# Patient Record
Sex: FEMALE | Race: WHITE | NOT HISPANIC OR LATINO | Employment: UNEMPLOYED | URBAN - METROPOLITAN AREA
[De-identification: names, ages, dates, MRNs, and addresses within clinical notes are randomized per-mention and may not be internally consistent; named-entity substitution may affect disease eponyms.]

---

## 2019-06-26 ENCOUNTER — APPOINTMENT (OUTPATIENT)
Dept: GENERAL RADIOLOGY | Facility: HOSPITAL | Age: 54
End: 2019-06-26

## 2019-06-26 ENCOUNTER — HOSPITAL ENCOUNTER (EMERGENCY)
Facility: HOSPITAL | Age: 54
Discharge: HOME | End: 2019-06-26
Attending: EMERGENCY MEDICINE | Admitting: EMERGENCY MEDICINE

## 2019-06-26 VITALS
SYSTOLIC BLOOD PRESSURE: 119 MMHG | HEART RATE: 60 BPM | OXYGEN SATURATION: 98 % | DIASTOLIC BLOOD PRESSURE: 76 MMHG | BODY MASS INDEX: 22.44 KG/M2 | WEIGHT: 134.7 LBS | RESPIRATION RATE: 15 BRPM | TEMPERATURE: 97.9 F | HEIGHT: 65 IN

## 2019-06-26 DIAGNOSIS — R07.89 CHEST WALL PAIN: ICD-10-CM

## 2019-06-26 DIAGNOSIS — R10.11 RIGHT UPPER QUADRANT ABDOMINAL PAIN: Primary | ICD-10-CM

## 2019-06-26 LAB
ALBUMIN SERPL-MCNC: 3.7 G/DL (ref 3.5–4.8)
ALBUMIN/GLOB SERPL: 1.4 G/DL (ref 1–1.7)
ALP SERPL-CCNC: 50 U/L (ref 32–91)
ALT SERPL W P-5'-P-CCNC: 12 U/L (ref 14–54)
ANION GAP SERPL CALCULATED.3IONS-SCNC: 11 MMOL/L (ref 10–20)
AST SERPL-CCNC: 22 U/L (ref 15–41)
BASOPHILS # BLD AUTO: 0.1 10*3/MM3 (ref 0–0.2)
BASOPHILS NFR BLD AUTO: 1.4 % (ref 0–1.5)
BILIRUB SERPL-MCNC: 0.6 MG/DL (ref 0.3–1.2)
BILIRUB UR QL STRIP: NEGATIVE
BUN BLD-MCNC: 11 MG/DL (ref 8–20)
BUN/CREAT SERPL: 15.7 (ref 5.4–26.2)
CALCIUM SPEC-SCNC: 9 MG/DL (ref 8.9–10.3)
CHLORIDE SERPL-SCNC: 106 MMOL/L (ref 101–111)
CLARITY UR: CLEAR
CO2 SERPL-SCNC: 23 MMOL/L (ref 22–32)
COLOR UR: YELLOW
CREAT BLD-MCNC: 0.7 MG/DL (ref 0.4–1)
D DIMER PPP FEU-MCNC: <0.17 MCGFEU/ML (ref 0.17–0.59)
DEPRECATED RDW RBC AUTO: 43.3 FL (ref 37–54)
EOSINOPHIL # BLD AUTO: 0.2 10*3/MM3 (ref 0–0.4)
EOSINOPHIL NFR BLD AUTO: 4.4 % (ref 0.3–6.2)
ERYTHROCYTE [DISTWIDTH] IN BLOOD BY AUTOMATED COUNT: 13.5 % (ref 12.3–15.4)
GFR SERPL CREATININE-BSD FRML MDRD: 88 ML/MIN/1.73
GLOBULIN UR ELPH-MCNC: 2.7 GM/DL (ref 2.5–3.8)
GLUCOSE BLD-MCNC: 108 MG/DL (ref 65–99)
GLUCOSE UR STRIP-MCNC: NEGATIVE MG/DL
HCT VFR BLD AUTO: 41 % (ref 34–46.6)
HGB BLD-MCNC: 13.9 G/DL (ref 12–15.9)
HGB UR QL STRIP.AUTO: NEGATIVE
KETONES UR QL STRIP: NEGATIVE
LEUKOCYTE ESTERASE UR QL STRIP.AUTO: NEGATIVE
LYMPHOCYTES # BLD AUTO: 1.9 10*3/MM3 (ref 0.7–3.1)
LYMPHOCYTES NFR BLD AUTO: 37 % (ref 19.6–45.3)
MCH RBC QN AUTO: 31 PG (ref 26.6–33)
MCHC RBC AUTO-ENTMCNC: 34 G/DL (ref 31.5–35.7)
MCV RBC AUTO: 91.1 FL (ref 79–97)
MONOCYTES # BLD AUTO: 0.4 10*3/MM3 (ref 0.1–0.9)
MONOCYTES NFR BLD AUTO: 7.5 % (ref 5–12)
NEUTROPHILS # BLD AUTO: 2.5 10*3/MM3 (ref 1.7–7)
NEUTROPHILS NFR BLD AUTO: 49.7 % (ref 42.7–76)
NITRITE UR QL STRIP: NEGATIVE
NRBC BLD AUTO-RTO: 0.2 /100 WBC (ref 0–0.2)
PH UR STRIP.AUTO: 6 [PH] (ref 5–8)
PLATELET # BLD AUTO: 225 10*3/MM3 (ref 140–450)
PMV BLD AUTO: 8.6 FL (ref 6–12)
POTASSIUM BLD-SCNC: 3.8 MMOL/L (ref 3.6–5.1)
PROT SERPL-MCNC: 6.4 G/DL (ref 6.1–7.9)
PROT UR QL STRIP: NEGATIVE
RBC # BLD AUTO: 4.5 10*6/MM3 (ref 3.77–5.28)
SODIUM BLD-SCNC: 140 MMOL/L (ref 136–144)
SP GR UR STRIP: 1.02 (ref 1–1.03)
UROBILINOGEN UR QL STRIP: NORMAL
WBC NRBC COR # BLD: 5.1 10*3/MM3 (ref 3.4–10.8)

## 2019-06-26 PROCEDURE — 25010000002 KETOROLAC TROMETHAMINE PER 15 MG: Performed by: EMERGENCY MEDICINE

## 2019-06-26 PROCEDURE — 99284 EMERGENCY DEPT VISIT MOD MDM: CPT

## 2019-06-26 PROCEDURE — 96375 TX/PRO/DX INJ NEW DRUG ADDON: CPT

## 2019-06-26 PROCEDURE — 93005 ELECTROCARDIOGRAM TRACING: CPT | Performed by: EMERGENCY MEDICINE

## 2019-06-26 PROCEDURE — 25010000002 MORPHINE PER 10 MG: Performed by: EMERGENCY MEDICINE

## 2019-06-26 PROCEDURE — 85379 FIBRIN DEGRADATION QUANT: CPT | Performed by: EMERGENCY MEDICINE

## 2019-06-26 PROCEDURE — 25010000002 ONDANSETRON PER 1 MG: Performed by: EMERGENCY MEDICINE

## 2019-06-26 PROCEDURE — 80053 COMPREHEN METABOLIC PANEL: CPT | Performed by: EMERGENCY MEDICINE

## 2019-06-26 PROCEDURE — 96374 THER/PROPH/DIAG INJ IV PUSH: CPT

## 2019-06-26 PROCEDURE — 81003 URINALYSIS AUTO W/O SCOPE: CPT | Performed by: EMERGENCY MEDICINE

## 2019-06-26 PROCEDURE — 85025 COMPLETE CBC W/AUTO DIFF WBC: CPT | Performed by: EMERGENCY MEDICINE

## 2019-06-26 PROCEDURE — 71046 X-RAY EXAM CHEST 2 VIEWS: CPT

## 2019-06-26 RX ORDER — ONDANSETRON 2 MG/ML
4 INJECTION INTRAMUSCULAR; INTRAVENOUS ONCE
Status: COMPLETED | OUTPATIENT
Start: 2019-06-26 | End: 2019-06-26

## 2019-06-26 RX ORDER — SODIUM CHLORIDE 0.9 % (FLUSH) 0.9 %
10 SYRINGE (ML) INJECTION AS NEEDED
Status: DISCONTINUED | OUTPATIENT
Start: 2019-06-26 | End: 2019-06-26 | Stop reason: HOSPADM

## 2019-06-26 RX ORDER — KETOROLAC TROMETHAMINE 15 MG/ML
15 INJECTION, SOLUTION INTRAMUSCULAR; INTRAVENOUS ONCE
Status: COMPLETED | OUTPATIENT
Start: 2019-06-26 | End: 2019-06-26

## 2019-06-26 RX ORDER — ONDANSETRON 4 MG/1
4 TABLET, FILM COATED ORAL EVERY 6 HOURS PRN
Qty: 12 TABLET | Refills: 0 | Status: SHIPPED | OUTPATIENT
Start: 2019-06-26 | End: 2022-05-13

## 2019-06-26 RX ORDER — MORPHINE SULFATE 4 MG/ML
4 INJECTION, SOLUTION INTRAMUSCULAR; INTRAVENOUS ONCE
Status: COMPLETED | OUTPATIENT
Start: 2019-06-26 | End: 2019-06-26

## 2019-06-26 RX ORDER — MELOXICAM 15 MG/1
15 TABLET ORAL DAILY PRN
Qty: 10 TABLET | Refills: 0 | Status: SHIPPED | OUTPATIENT
Start: 2019-06-26 | End: 2022-10-17

## 2019-06-26 RX ORDER — HYDROCODONE BITARTRATE AND ACETAMINOPHEN 5; 325 MG/1; MG/1
1 TABLET ORAL EVERY 6 HOURS PRN
Qty: 12 TABLET | Refills: 0 | Status: SHIPPED | OUTPATIENT
Start: 2019-06-26 | End: 2022-05-13

## 2019-06-26 RX ADMIN — MORPHINE SULFATE 4 MG: 4 INJECTION INTRAVENOUS at 06:46

## 2019-06-26 RX ADMIN — KETOROLAC TROMETHAMINE 15 MG: 15 INJECTION, SOLUTION INTRAMUSCULAR; INTRAVENOUS at 05:54

## 2019-06-26 RX ADMIN — ONDANSETRON 4 MG: 2 INJECTION INTRAMUSCULAR; INTRAVENOUS at 06:46

## 2019-08-05 ENCOUNTER — TRANSCRIBE ORDERS (OUTPATIENT)
Dept: ADMINISTRATIVE | Facility: HOSPITAL | Age: 54
End: 2019-08-05

## 2019-08-05 DIAGNOSIS — R10.11 ABDOMINAL PAIN, RIGHT UPPER QUADRANT: Primary | ICD-10-CM

## 2019-08-09 ENCOUNTER — HOSPITAL ENCOUNTER (OUTPATIENT)
Dept: NUCLEAR MEDICINE | Facility: HOSPITAL | Age: 54
Discharge: HOME | End: 2019-08-09

## 2019-08-09 DIAGNOSIS — R10.11 ABDOMINAL PAIN, RIGHT UPPER QUADRANT: ICD-10-CM

## 2019-08-09 PROCEDURE — 0 TECHNETIUM TC 99M MEBROFENIN KIT: Performed by: FAMILY MEDICINE

## 2019-08-09 PROCEDURE — 78227 HEPATOBIL SYST IMAGE W/DRUG: CPT

## 2019-08-09 PROCEDURE — A9537 TC99M MEBROFENIN: HCPCS | Performed by: FAMILY MEDICINE

## 2019-08-09 RX ORDER — KIT FOR THE PREPARATION OF TECHNETIUM TC 99M MEBROFENIN 45 MG/10ML
1 INJECTION, POWDER, LYOPHILIZED, FOR SOLUTION INTRAVENOUS
Status: COMPLETED | OUTPATIENT
Start: 2019-08-09 | End: 2019-08-09

## 2019-08-09 RX ADMIN — MEBROFENIN 1 DOSE: 45 INJECTION, POWDER, LYOPHILIZED, FOR SOLUTION INTRAVENOUS at 10:10

## 2020-06-09 ENCOUNTER — TREATMENT (OUTPATIENT)
Dept: PHYSICAL THERAPY | Facility: CLINIC | Age: 55
End: 2020-06-09

## 2020-06-09 ENCOUNTER — TRANSCRIBE ORDERS (OUTPATIENT)
Dept: PHYSICAL THERAPY | Facility: CLINIC | Age: 55
End: 2020-06-09

## 2020-06-09 DIAGNOSIS — S46.001A ROTATOR CUFF INJURY, RIGHT, INITIAL ENCOUNTER: ICD-10-CM

## 2020-06-09 DIAGNOSIS — S46.001A INJURY OF RIGHT ROTATOR CUFF, INITIAL ENCOUNTER: Primary | ICD-10-CM

## 2020-06-09 DIAGNOSIS — S46.001A ROTATOR CUFF INJURY, RIGHT, INITIAL ENCOUNTER: Primary | ICD-10-CM

## 2020-06-09 PROCEDURE — 97110 THERAPEUTIC EXERCISES: CPT | Performed by: PHYSICAL THERAPIST

## 2020-06-09 PROCEDURE — 97161 PT EVAL LOW COMPLEX 20 MIN: CPT | Performed by: PHYSICAL THERAPIST

## 2020-06-09 NOTE — PROGRESS NOTES
"Physical Therapy Initial Evaluation and Plan of Care    Patient: Tonja Ledbetter   : 1965  Diagnosis/ICD-10 Code:  Injury of right rotator cuff, initial encounter [S46.001A]  Referring practitioner: Víctor Hull MD    Subjective Evaluation    History of Present Illness  Mechanism of injury: Patient reports she is coming to therapy due to R shoulder pain.  She fell on  catching herself with her arm behind her on her hand.  She felt her shoulder be pushed upward.  Ever since she is not able to raise her arm all the way up without help from her L arm.  She tre N/T or any neck pain.  Only is having pain when raising her arm, and sometimes she is noticing some discomfort at rest if she reaches the wrong way.  She was diagnosed with breast CA in September last year, but now is \"cured\" per pt report.  She is still working on getting her energy and strength back, and she does walk daily.        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  Quality of life: good    Pain  Current pain ratin  At best pain ratin  At worst pain rating: 10  Location: R shoulder into biceps region   Quality: discomfort, throbbing and dull ache  Relieving factors: relaxation, rest, support and change in position  Aggravating factors: lifting, movement, overhead activity, prolonged positioning, outstretched reach and repetitive movement  Progression: no change    Social Support  Lives in: multiple-level home  Lives with: spouse    Hand dominance: right    Diagnostic Tests  No diagnostic tests performed    Treatments  No previous or current treatments  Patient Goals  Patient goals for therapy: decreased pain, increased motion, increased strength and independence with ADLs/IADLs        Objective          Postural Observations  Seated posture: fair  Standing posture: good  Correction of posture: has no consistent effect    Additional Postural Observation Details  Slight forward head posture with rounded shoulders " slightly.     Palpation     Right Tenderness of the anterior deltoid, biceps and supraspinatus.     Tenderness     Right Shoulder  Tenderness in the biceps tendon (proximal) and supraspinatus tendon.     Cervical/Thoracic Screen   Cervical range of motion within normal limits    Active Range of Motion   Left Shoulder   Normal active range of motion    Right Shoulder   Flexion: 68 degrees with pain  Extension: 56 degrees   Abduction: 60 degrees with pain  External rotation 0°: 52 degrees   Internal rotation BTB: T10 with pain    Passive Range of Motion     Additional Passive Range of Motion Details  R shoulder PROM grossly WFL; however, pain at ~160 degrees flexion and abduction. ER pain at ~65 degrees. IR no pain passively.     Joint Play     Right Shoulder  Joints within functional limits are the posterior capsule, inferior capsule and long axis distraction.     Strength/Myotome Testing     Left Shoulder     Planes of Motion   Flexion: 4+   Abduction: 4+   External rotation at 0°: 4+   Internal rotation at 0°: 4+     Isolated Muscles   Biceps: 4+   Supraspinatus: 4+   Triceps: 4+     Right Shoulder     Planes of Motion   Flexion: 2-   Abduction: 2-   External rotation at 0°: 3-   Internal rotation at 0°: 3-     Isolated Muscles   Biceps: 4   Supraspinatus: 3-   Triceps: 4     Tests     Right Shoulder   Positive empty can, full can and lift-off.        Assessment & Plan     Assessment  Impairments: abnormal muscle firing, abnormal or restricted ROM, activity intolerance, impaired physical strength, lacks appropriate home exercise program and pain with function  Prognosis: good  Functional Limitations: carrying objects, lifting, pulling, pushing, uncomfortable because of pain, reaching behind back, reaching overhead and unable to perform repetitive tasks  Goals  Plan Goals: Pt would benefit from skilled care for the listed deficits of the R shoulder.    SHORT TERM GOALS: Time for Goal Achievement: 4 weeks  1.   Patient to be compliant with and independent with progression of HEP.   2. Patient to improve her R shoulder elevation to 110 degrees or greater with min pain to improve her ability to perform above shoulder level activities.   3. Pt to improve her pain at worst to a 6/10 to improve her ability to perform daily functional tasks.     LONG TERM GOALS: Time for Goal Achievement: 2 months  1.  Pt score < 30% perceived disability on Quick DASH.  2.  Pt. to exhibit (R) shoulder AROM to WFL to allow for reaching overhead and out (ABD) without pain limiting function.  3.  Pt to exhibit 4+/5 UE strength, or greater, to allow for pushing /pulling, reaching, and lifting to occur with pain < 3/10.   4.  Pt able to reach overhead and lift 10# to allow for return to doing home/yard/recreational activities with min to no pain.      Plan  Therapy options: will be seen for skilled physical therapy services  Planned modality interventions: cryotherapy and thermotherapy (hydrocollator packs)  Planned therapy interventions: functional ROM exercises, flexibility, home exercise program, joint mobilization, manual therapy, soft tissue mobilization, spinal/joint mobilization, strengthening, stretching and therapeutic activities  Frequency: 2x week  Duration in visits: 16  Treatment plan discussed with: patient  Plan details: Progress ROM/strengthening/stabilization/functional activity as tolerated.             History # of Personal Factors and/or Comorbidities: MODERATE (1-2)  Examination of Body System(s): # of elements: LOW (1-2)  Clinical Presentation: STABLE   Clinical Decision Making: LOW     Timed:         Manual Therapy:         mins  12309;     Therapeutic Exercise:    20     mins  22396;     Neuromuscular [place]:        mins  13333;    Therapeutic Activity:          mins  36964;     Gait Training:           mins  97142;     Ultrasound:          mins  94711;    Ionto                                   mins   30333  Self Care                             mins   29946        Un-Timed:  Electrical Stimulation:         mins  84846 ( );  Dry Needling          mins self-pay  Traction          mins 37156  Low Eval    40      Mins  93813  Mod Eval          Mins  66867  High Eval                            Mins  41898  Re-Eval                               mins  97938        Timed Treatment:  20    mins   Total Treatment:     60   mins  PT SIGNATURE: Katlin Turner, PT, DPT    DATE TREATMENT INITIATED: 6/9/2020    Initial Certification  Certification Period: 9/7/2020  I certify that the therapy services are furnished while this patient is under my care.  The services outlined above are required by this patient, and will be reviewed every 90 days.     PHYSICIAN:       DATE:     Please sign and return via fax to [phone].. Thank you, [facility].

## 2020-06-11 ENCOUNTER — TREATMENT (OUTPATIENT)
Dept: PHYSICAL THERAPY | Facility: CLINIC | Age: 55
End: 2020-06-11

## 2020-06-11 DIAGNOSIS — S46.001A INJURY OF RIGHT ROTATOR CUFF, INITIAL ENCOUNTER: Primary | ICD-10-CM

## 2020-06-11 DIAGNOSIS — S46.001A ROTATOR CUFF INJURY, RIGHT, INITIAL ENCOUNTER: ICD-10-CM

## 2020-06-11 PROCEDURE — 97140 MANUAL THERAPY 1/> REGIONS: CPT | Performed by: PHYSICAL THERAPIST

## 2020-06-11 PROCEDURE — 97110 THERAPEUTIC EXERCISES: CPT | Performed by: PHYSICAL THERAPIST

## 2020-06-11 NOTE — PROGRESS NOTES
Physical Therapy Daily Progress Note    VISIT#: 2/16 in POC.     Subjective   Tonja Ledbetter reports she is doing ok, but has been sore since beginning her HEP.  She notices some soreness along her incision on her R breast and some discomfort into her axillary region.      Pain Rating (0/10): 5    Objective     See Exercise, Manual, and Modality Logs for complete treatment.     Patient Education: Instructed pt to continue with current HEP.  Discussed shoulder pulleys also.       Assessment/Plan   Cues via verbal and tactile required to help pt reduce UT over activation and encourage good GH joint motion.  Slight soreness end of treatment.     Plan  Progress strengthening /stabilization /functional activity.             Timed:         Manual Therapy:    15     mins  47504;     Therapeutic Exercise:    30     mins  55693;     Neuromuscular [place]:        mins  16180;    Therapeutic Activity:          mins  52680;     Gait Training:           mins  94929;     Ultrasound:          mins  98902;    Ionto                                   mins   29895  Self Care                            mins   22493    Un-Timed:  Electrical Stimulation:         mins  58439 ( );  Dry Needling          mins self-pay  Traction          mins 17540  Low Eval          Mins  64770  Mod Eval          Mins  40373  High Eval                            Mins  37684  Re-Eval                               mins  23734    Timed Treatment:   45   mins   Total Treatment:     45   mins    Katlin Turner, PT, DPT  Physical Therapist   Home

## 2020-06-18 ENCOUNTER — OFFICE (OUTPATIENT)
Dept: URBAN - METROPOLITAN AREA CLINIC 64 | Facility: CLINIC | Age: 55
End: 2020-06-18
Payer: COMMERCIAL

## 2020-06-18 VITALS
DIASTOLIC BLOOD PRESSURE: 75 MMHG | SYSTOLIC BLOOD PRESSURE: 119 MMHG | HEIGHT: 65 IN | WEIGHT: 127 LBS | HEART RATE: 79 BPM

## 2020-06-18 DIAGNOSIS — R10.31 RIGHT LOWER QUADRANT PAIN: ICD-10-CM

## 2020-06-18 DIAGNOSIS — Z12.11 ENCOUNTER FOR SCREENING FOR MALIGNANT NEOPLASM OF COLON: ICD-10-CM

## 2020-06-18 DIAGNOSIS — R10.11 RIGHT UPPER QUADRANT PAIN: ICD-10-CM

## 2020-06-18 PROCEDURE — 99203 OFFICE O/P NEW LOW 30 MIN: CPT | Performed by: INTERNAL MEDICINE

## 2020-06-18 RX ORDER — DICYCLOMINE HYDROCHLORIDE 20 MG/1
20 TABLET ORAL
Qty: 30 | Refills: 11 | Status: ACTIVE
Start: 2020-06-18

## 2020-07-01 RX ORDER — LORAZEPAM 2 MG/1
1 TABLET ORAL NIGHTLY
COMMUNITY
End: 2022-05-13

## 2020-07-01 RX ORDER — IBUPROFEN 200 MG
200 TABLET ORAL EVERY 6 HOURS PRN
COMMUNITY

## 2020-07-11 ENCOUNTER — LAB (OUTPATIENT)
Dept: LAB | Facility: HOSPITAL | Age: 55
End: 2020-07-11

## 2020-07-11 PROCEDURE — C9803 HOPD COVID-19 SPEC COLLECT: HCPCS

## 2020-07-11 PROCEDURE — U0002 COVID-19 LAB TEST NON-CDC: HCPCS

## 2020-07-11 PROCEDURE — U0004 COV-19 TEST NON-CDC HGH THRU: HCPCS

## 2020-07-13 ENCOUNTER — ANESTHESIA EVENT (OUTPATIENT)
Dept: GASTROENTEROLOGY | Facility: HOSPITAL | Age: 55
End: 2020-07-13

## 2020-07-13 LAB
REF LAB TEST METHOD: NORMAL
SARS-COV-2 RNA RESP QL NAA+PROBE: NOT DETECTED

## 2020-07-14 ENCOUNTER — HOSPITAL ENCOUNTER (OUTPATIENT)
Facility: HOSPITAL | Age: 55
Setting detail: HOSPITAL OUTPATIENT SURGERY
Discharge: HOME | End: 2020-07-14
Attending: INTERNAL MEDICINE | Admitting: INTERNAL MEDICINE

## 2020-07-14 ENCOUNTER — ON CAMPUS - OUTPATIENT (OUTPATIENT)
Dept: URBAN - METROPOLITAN AREA HOSPITAL 85 | Facility: HOSPITAL | Age: 55
End: 2020-07-14
Payer: COMMERCIAL

## 2020-07-14 ENCOUNTER — ANESTHESIA (OUTPATIENT)
Dept: GASTROENTEROLOGY | Facility: HOSPITAL | Age: 55
End: 2020-07-14

## 2020-07-14 VITALS
WEIGHT: 126.98 LBS | HEIGHT: 65 IN | TEMPERATURE: 98.4 F | RESPIRATION RATE: 14 BRPM | BODY MASS INDEX: 21.16 KG/M2 | HEART RATE: 77 BPM | SYSTOLIC BLOOD PRESSURE: 137 MMHG | DIASTOLIC BLOOD PRESSURE: 76 MMHG | OXYGEN SATURATION: 98 %

## 2020-07-14 DIAGNOSIS — R10.11 RIGHT UPPER QUADRANT PAIN: ICD-10-CM

## 2020-07-14 DIAGNOSIS — K25.9 GASTRIC ULCER, UNSPECIFIED AS ACUTE OR CHRONIC, WITHOUT HEMO: ICD-10-CM

## 2020-07-14 DIAGNOSIS — Z12.11 ENCOUNTER FOR SCREENING FOR MALIGNANT NEOPLASM OF COLON: ICD-10-CM

## 2020-07-14 PROBLEM — C50.911 BREAST CANCER, RIGHT BREAST (HCC): Status: ACTIVE | Noted: 2020-02-07

## 2020-07-14 PROBLEM — C50.411 MALIGNANT NEOPLASM OF UPPER-OUTER QUADRANT OF RIGHT BREAST IN FEMALE, ESTROGEN RECEPTOR NEGATIVE: Status: ACTIVE | Noted: 2019-09-25

## 2020-07-14 PROBLEM — Z09 CHEMOTHERAPY FOLLOW-UP EXAMINATION: Status: ACTIVE | Noted: 2019-10-18

## 2020-07-14 PROBLEM — Z17.1 MALIGNANT NEOPLASM OF UPPER-OUTER QUADRANT OF RIGHT BREAST IN FEMALE, ESTROGEN RECEPTOR NEGATIVE (HCC): Status: ACTIVE | Noted: 2019-09-25

## 2020-07-14 PROCEDURE — 25010000002 ONDANSETRON PER 1 MG

## 2020-07-14 PROCEDURE — 25010000002 PROPOFOL 10 MG/ML EMULSION: Performed by: STUDENT IN AN ORGANIZED HEALTH CARE EDUCATION/TRAINING PROGRAM

## 2020-07-14 PROCEDURE — 43235 EGD DIAGNOSTIC BRUSH WASH: CPT | Mod: 59 | Performed by: INTERNAL MEDICINE

## 2020-07-14 PROCEDURE — 45378 DIAGNOSTIC COLONOSCOPY: CPT | Mod: 33 | Performed by: INTERNAL MEDICINE

## 2020-07-14 RX ORDER — ONDANSETRON 2 MG/ML
4 INJECTION INTRAMUSCULAR; INTRAVENOUS ONCE
Status: COMPLETED | OUTPATIENT
Start: 2020-07-14 | End: 2020-07-14

## 2020-07-14 RX ORDER — ESCITALOPRAM OXALATE 10 MG/1
10 TABLET ORAL DAILY
COMMUNITY

## 2020-07-14 RX ORDER — MAGNESIUM CARB/ALUMINUM HYDROX 105-160MG
296 TABLET,CHEWABLE ORAL ONCE
Status: DISCONTINUED | OUTPATIENT
Start: 2020-07-14 | End: 2020-07-14 | Stop reason: HOSPADM

## 2020-07-14 RX ORDER — SODIUM CHLORIDE 0.9 % (FLUSH) 0.9 %
10 SYRINGE (ML) INJECTION AS NEEDED
Status: DISCONTINUED | OUTPATIENT
Start: 2020-07-14 | End: 2020-07-14 | Stop reason: HOSPADM

## 2020-07-14 RX ORDER — LIDOCAINE HYDROCHLORIDE 20 MG/ML
INJECTION, SOLUTION INFILTRATION; PERINEURAL AS NEEDED
Status: DISCONTINUED | OUTPATIENT
Start: 2020-07-14 | End: 2020-07-14 | Stop reason: SURG

## 2020-07-14 RX ORDER — ONDANSETRON 2 MG/ML
INJECTION INTRAMUSCULAR; INTRAVENOUS
Status: COMPLETED
Start: 2020-07-14 | End: 2020-07-14

## 2020-07-14 RX ORDER — GLYCOPYRROLATE 0.2 MG/ML
INJECTION INTRAMUSCULAR; INTRAVENOUS AS NEEDED
Status: DISCONTINUED | OUTPATIENT
Start: 2020-07-14 | End: 2020-07-14 | Stop reason: SURG

## 2020-07-14 RX ORDER — ONDANSETRON 2 MG/ML
4 INJECTION INTRAMUSCULAR; INTRAVENOUS ONCE AS NEEDED
Status: DISCONTINUED | OUTPATIENT
Start: 2020-07-14 | End: 2020-07-14 | Stop reason: HOSPADM

## 2020-07-14 RX ORDER — SODIUM CHLORIDE 9 MG/ML
9 INJECTION, SOLUTION INTRAVENOUS CONTINUOUS PRN
Status: DISCONTINUED | OUTPATIENT
Start: 2020-07-14 | End: 2020-07-14 | Stop reason: HOSPADM

## 2020-07-14 RX ORDER — PROPOFOL 10 MG/ML
VIAL (ML) INTRAVENOUS AS NEEDED
Status: DISCONTINUED | OUTPATIENT
Start: 2020-07-14 | End: 2020-07-14 | Stop reason: SURG

## 2020-07-14 RX ORDER — SODIUM CHLORIDE 0.9 % (FLUSH) 0.9 %
3 SYRINGE (ML) INJECTION EVERY 12 HOURS SCHEDULED
Status: DISCONTINUED | OUTPATIENT
Start: 2020-07-14 | End: 2020-07-14 | Stop reason: HOSPADM

## 2020-07-14 RX ORDER — SODIUM CHLORIDE 0.9 % (FLUSH) 0.9 %
10 SYRINGE (ML) INJECTION EVERY 12 HOURS SCHEDULED
Status: DISCONTINUED | OUTPATIENT
Start: 2020-07-14 | End: 2020-07-14 | Stop reason: HOSPADM

## 2020-07-14 RX ADMIN — PROPOFOL 600 MG: 10 INJECTION, EMULSION INTRAVENOUS at 08:10

## 2020-07-14 RX ADMIN — ONDANSETRON 4 MG: 2 INJECTION INTRAMUSCULAR; INTRAVENOUS at 07:41

## 2020-07-14 RX ADMIN — LIDOCAINE HYDROCHLORIDE 100 MG: 20 INJECTION, SOLUTION INFILTRATION; PERINEURAL at 08:08

## 2020-07-14 RX ADMIN — SODIUM CHLORIDE 9 ML/HR: 900 INJECTION, SOLUTION INTRAVENOUS at 07:27

## 2020-07-14 RX ADMIN — GLYCOPYRROLATE 0.2 MCG: 0.2 INJECTION, SOLUTION INTRAMUSCULAR; INTRAVENOUS at 08:08

## 2020-07-14 NOTE — OP NOTE
COLONOSCOPY, ESOPHAGOGASTRODUODENOSCOPY Procedure Report    Patient Name:  Tonja Ledbetter  YOB: 1965    Date of Surgery:  7/14/2020     Pre-Op Diagnosis:  Screening for malignant neoplasm of colon [Z12.11]  Chronic RLQ pain [R10.31, G89.29]  RUQ pain [R10.11]       Post-Op Diagnosis Codes:     * Screening for malignant neoplasm of colon [Z12.11]     * Chronic RLQ pain [R10.31, G89.29]     * RUQ pain [R10.11]      Procedure/CPT® Codes:        Staff:  Surgeon(s):  Cong Alvares MD         Anesthesia: Monitored Anesthesia Care    Implants:    Nothing was implanted during the procedure    Specimen:        See Below    Complications:  None    Description of Procedure:  Informed consent was obtained for the procedure, including sedation.  Risks of perforation, hemorrhage, adverse drug reaction and aspiration were discussed.  The patient was brought into the endoscopy suite. Continuous cardiopulmonary monitoring was performed. The patient was placed in the left lateral decubitus position.  The bite block was inserted into the patient's mouth. After adequate sedation was attained, the Olympus gastroscope was inserted into the patient's mouth and advanced to the second portion of the duodenum without difficulty.  Circumferential examination was performed. A retroflex exam was performed in the patient's stomach.  On completion of the exam, the bowel was decompressed, the scope was removed from the patient, the patient tolerated the procedure well, there were no immediate post-operative complications.     Examination of the esophagus: Normal  Examination of the stomach: Small shallow gastric ulcers which were nonbleeding  Examination of the duodenum: Normal    Subsequently,  the Olympus colonoscope was inserted into the patient's rectum and advanced to the level of the cecum and terminal ileum without difficulty.  The bowel prep was excellent.  Circumferential examination of the patient's colon was  performed on scope withdrawal.  The cecum, ascending colon, and hepatic flexure were examined twice.  The transverse colon, splenic flexure, descending colon, and rectum were examined.  A retroflex exam was performed in the rectum and was unremarkable.  The bowel was decompressed, the scope was withdrawn from the patient, and the patient tolerated the procedure well. There were no immediate post-operative complications.     Findings:   Terminal ileum: Normal  Colon: Normal    Impression:  1.  Small gastric ulcers  2.  Normal colonoscopy    Recommendations:  1.  High-fiber diet  2.  Proton pump inhibitor daily 3.  Follow-up in office  4.  Colonoscopy in 10 years      Cong Alvares MD     Date: 7/14/2020  Time: 08:34

## 2020-07-14 NOTE — ANESTHESIA POSTPROCEDURE EVALUATION
Patient: Tonja Ledbetter    Procedure Summary     Date:  07/14/20 Room / Location:  [facility] / [facility]    Anesthesia Start:  0804 Anesthesia Stop:  0836    Procedures:       COLONOSCOPY (N/A )      ESOPHAGOGASTRODUODENOSCOPY (N/A ) Diagnosis:       Screening for malignant neoplasm of colon      Chronic RLQ pain      RUQ pain      (Screening for malignant neoplasm of colon [Z12.11])      (Chronic RLQ pain [R10.31, G89.29])      (RUQ pain [R10.11])    Surgeon:  Cong Alvares MD Provider:  Piotr Cassidy MD    Anesthesia Type:  MAC ASA Status:  2          Anesthesia Type: MAC    Vitals  Vitals Value Taken Time   /69 7/14/2020  8:38 AM   Temp     Pulse 88 7/14/2020  8:43 AM   Resp 14 7/14/2020  8:36 AM   SpO2 100 % 7/14/2020  8:43 AM   Vitals shown include unvalidated device data.        Post Anesthesia Care and Evaluation    Patient location during evaluation: PACU  Patient participation: complete - patient participated  Level of consciousness: awake  Pain score: 0  Pain management: adequate  Airway patency: patent  Anesthetic complications: No anesthetic complications  PONV Status: none  Cardiovascular status: acceptable  Respiratory status: acceptable  Hydration status: acceptable    Comments: Patient seen and examined postoperatively; vital signs stable; SpO2 greater than or equal to 90%; cardiopulmonary status stable; nausea/vomiting adequately controlled; pain adequately controlled; no apparent anesthesia complications; patient discharged from anesthesia care when discharge criteria were met

## 2020-07-14 NOTE — H&P
Patient Care Team:  Víctor Hull MD as PCP - General (Family Medicine)    Chief complaint: Right upper quadrant pain, screening colonoscopy    Subjective .     History of present illness: Right upper quadrant pain, screening colonoscopy    Review of Systems      Endo History:  None      Past Medical History:  Past Medical History:   Diagnosis Date   • Cancer (CMS/HCC) 2019    breast   • Rotator cuff injury     right       Past Surgical History:  Past Surgical History:   Procedure Laterality Date   • APPENDECTOMY     • BREAST SURGERY     • CERVICAL DISC SURGERY         Social History:  Social History     Tobacco Use   • Smoking status: Never Smoker   • Smokeless tobacco: Never Used   Substance Use Topics   • Alcohol use: Not Currently   • Drug use: Never       Family History:  History reviewed. No pertinent family history.    Medications:  Medications Prior to Admission   Medication Sig Dispense Refill Last Dose   • Cholecalciferol (VITAMIN D3) 125 MCG (5000 UT) capsule capsule Take 5,000 Units by mouth Daily.      • ibuprofen (ADVIL,MOTRIN) 200 MG tablet Take 200 mg by mouth Every 6 (Six) Hours As Needed for Mild Pain .      • LORazepam (ATIVAN) 2 MG tablet Take 2.5 mg by mouth Every Night.      • Multiple Vitamins-Minerals (MULTI COMPLETE PO) Take  by mouth.      • HYDROcodone-acetaminophen (NORCO) 5-325 MG per tablet Take 1 tablet by mouth Every 6 (Six) Hours As Needed for Moderate Pain . 12 tablet 0    • meloxicam (MOBIC) 15 MG tablet Take 1 tablet by mouth Daily As Needed for Mild Pain  or Moderate Pain . 10 tablet 0    • ondansetron (ZOFRAN) 4 MG tablet Take 1 tablet by mouth Every 6 (Six) Hours As Needed for Nausea or Vomiting. 12 tablet 0        Scheduled Meds:  Continuous Infusions:  No current facility-administered medications for this encounter.   PRN Meds:.    ALLERGIES:  Patient has no known allergies.        Objective     Vital Signs:        Physical Exam:      General Appearance:    Awake and  alert, in no acute distress   Head:    Normocephalic, without obvious abnormality, atraumatic   Eyes:            Conjunctivae normal, anicteric sclera   Ears:    Ears appear intact with no abnormalities noted   Throat:   No oral lesions, no thrush, oral mucosa moist   Neck:   No adenopathy, supple, no thyromegaly, no JVD   Lungs:     Clear to auscultation bilaterally, respirations regular, even and unlabored    Heart:    Regular rhythm and normal rate, normal S1 and S2, no            murmur, no gallop, no rub   Chest Wall:    No abnormalities observed   Abdomen:     Normal bowel sounds, soft, non-tender, no rebound or guarding, non-distended, no hepatosplenomegaly   Rectal:     Deferred   Extremities:   Moves all extremities well, no edema, no cyanosis, no             redness   Pulses:   Pulses palpable and equal bilaterally   Skin:   No bleeding, bruising or rash, no jaundice   Lymph nodes:   No palpable adenopathy   Neurologic:   Cranial nerves 2 - 12 grossly intact, no asterixis, sensation intact       Results Review:   I reviewed the patient's labs and imaging.  Lab Results (last 24 hours)     ** No results found for the last 24 hours. **          Imaging Results (Last 24 Hours)     ** No results found for the last 24 hours. **             ASSESSMENT:    Right upper quadrant pain, screening colonoscopy      Active Problems:    * No active hospital problems. *       PLAN:    EGD and colonoscopy      I discussed the patients findings and my recommendations with the patient.  Cong Alvares MD  07/14/20  07:01

## 2020-07-14 NOTE — DISCHARGE INSTRUCTIONS
A responsible adult should stay with you and you should rest quietly for the rest of the day.    Do not drink alcohol, drive, operate any heavy machinery or power tools or make any legal/important decisions for the next 24 hours.     Progress your diet as tolerated.  If you begin to experience severe pain, increased shortness of breath, racing heartbeat or a fever above 101 F, seek immediate medical attention.     Follow up with MD as instructed.

## 2020-07-14 NOTE — ANESTHESIA PREPROCEDURE EVALUATION
Anesthesia Evaluation     Patient summary reviewed and Nursing notes reviewed   no history of anesthetic complications:  NPO Solid Status: > 8 hours  NPO Liquid Status: > 2 hours           Airway   Mallampati: I  TM distance: >3 FB  Neck ROM: full  No difficulty expected  Dental - normal exam     Pulmonary - negative pulmonary ROS and normal exam   Cardiovascular - negative cardio ROS and normal exam    ECG reviewed        Neuro/Psych  (+) psychiatric history Anxiety,     GI/Hepatic/Renal/Endo - negative ROS     Musculoskeletal (-) negative ROS    Abdominal  - normal exam   Substance History - negative use     OB/GYN negative ob/gyn ROS         Other      history of cancer                    Anesthesia Plan    ASA 2     MAC     intravenous induction     Anesthetic plan, all risks, benefits, and alternatives have been provided, discussed and informed consent has been obtained with: patient.

## 2020-08-06 ENCOUNTER — DOCUMENTATION (OUTPATIENT)
Dept: PHYSICAL THERAPY | Facility: CLINIC | Age: 55
End: 2020-08-06

## 2020-08-06 NOTE — PROGRESS NOTES
Discharge Summary  Discharge Summary from Physical Therapy Report      Date of Initial PT visit: 06/09/2020  Number of Visits Seen: 2     Discharge Status of Patient: Patient did not return after 2nd visit.  Was not showing much progress, and was in a lot of pain.  Has since gotten 2 opinions on options for surgery.  Pt plans to go forward with surgery and will plan to possibly return when referred back to PT.     Goals: Not Met   SHORT TERM GOALS: Time for Goal Achievement: 4 weeks  1.  Patient to be compliant with and independent with progression of HEP.   2. Patient to improve her R shoulder elevation to 110 degrees or greater with min pain to improve her ability to perform above shoulder level activities.   3. Pt to improve her pain at worst to a 6/10 to improve her ability to perform daily functional tasks.     LONG TERM GOALS: Time for Goal Achievement: 2 months  1.  Pt score < 30% perceived disability on Quick DASH.  2.  Pt. to exhibit (R) shoulder AROM to WFL to allow for reaching overhead and out (ABD) without pain limiting function.  3.  Pt to exhibit 4+/5 UE strength, or greater, to allow for pushing /pulling, reaching, and lifting to occur with pain < 3/10.   4.  Pt able to reach overhead and lift 10# to allow for return to doing home/yard/recreational activities with min to no pain.    Discharge Plan: Patient to return to referring/providing physician    Date of Discharge: 08/06/2020    Katlin Turner, PT, DPT  Physical Therapist

## 2020-10-12 ENCOUNTER — TREATMENT (OUTPATIENT)
Dept: PHYSICAL THERAPY | Facility: CLINIC | Age: 55
End: 2020-10-12

## 2020-10-12 DIAGNOSIS — Z98.890 S/P RIGHT ROTATOR CUFF REPAIR: Primary | ICD-10-CM

## 2020-10-12 DIAGNOSIS — M75.111 NONTRAUMATIC INCOMPLETE TEAR OF RIGHT ROTATOR CUFF: ICD-10-CM

## 2020-10-12 PROCEDURE — 97161 PT EVAL LOW COMPLEX 20 MIN: CPT | Performed by: PHYSICAL THERAPIST

## 2020-10-12 PROCEDURE — 97530 THERAPEUTIC ACTIVITIES: CPT | Performed by: PHYSICAL THERAPIST

## 2020-10-12 PROCEDURE — 97140 MANUAL THERAPY 1/> REGIONS: CPT | Performed by: PHYSICAL THERAPIST

## 2020-10-12 NOTE — PROGRESS NOTES
Physical Therapy Initial Evaluation and Plan of Care    Patient: Tonja Ledbetter   : 1965  Diagnosis/ICD-10 Code:  S/P right rotator cuff repair [Z98.890]  Referring practitioner: Jeferson Plaza MD  Date of Initial Visit: 10/12/2020  Today's Date: 10/12/2020  Patient seen for 1 sessions           Subjective Questionnaire: QuickDASH: 41% impairment       Subjective Evaluation    History of Present Illness  Mechanism of injury: 2020 massive (R) RCR - supraspinatus, infraspinatus, subscapularis by Dr. Plaza     Pt saw Dr. Plaza On Oct 6th and was able to remove the sling at that time. Is now able to start AAROM. Has been performing pendulums, elbow flex/forearm/gripping ROM at home.     Initial AKHIL - was walking on gravel and slipping, catching herself with the arm behind her in 2020     PMHx: breast CA - in remission, radiation ended in May     Limitations: sometimes wakes up with pain if she tries to roll to her side, reaching out, lifting items       Patient Occupation: not working right now - used to [employer] but is unsure if she will return  Quality of life: good    Pain  Current pain ratin  At best pain ratin  At worst pain ratin  Quality: dull ache and tight  Relieving factors: medications and ice  Aggravating factors: outstretched reach, repetitive movement, lifting, movement and overhead activity  Progression: improved    Social Support  Lives with: spouse    Treatments  No previous or current treatments  Patient Goals  Patient goals for therapy: decreased pain, increased motion, increased strength, independence with ADLs/IADLs and return to work             Objective          Neurological Testing     Sensation     Shoulder   Left Shoulder   Intact: light touch    Right Shoulder   Intact: light touch    Active Range of Motion   Left Shoulder   Normal active range of motion  Flexion: 180 degrees   Abduction: 180 degrees   External rotation BTH: T1   Internal rotation BTB: L2      Additional Active Range of Motion Details  (R) not tested d/t precautions     Passive Range of Motion     Right Shoulder   Flexion: 90 degrees with pain  External rotation 0°: 10 degrees   Internal rotation 0°: 30 degrees     Additional Passive Range of Motion Details  scaption 90 deg - slight pain at end range           Assessment & Plan     Assessment  Impairments: abnormal or restricted ROM, activity intolerance, impaired physical strength, lacks appropriate home exercise program and pain with function  Assessment details: Pt is a 54 yr/o female presenting post op (R) massive RCR on 8/27/2020 by Dr. Plaza. Supraspinatus, infraspinatus, and subscapularis repaired. Per QuickDASH, she reports 41 % impairment. Objective testing limited to PROM measurement d/t protocol. Initiated AAROM with max ed on proper positioning as well as safety while performing. Strength not tested. Educated on results of eval, as well as initial HEP. Pt with good understanding. Recommend skilled OPPT to address above issues, pt in agreement.   Prognosis: good  Functional Limitations: carrying objects, lifting, sleeping, pulling, pushing, uncomfortable because of pain, reaching behind back, reaching overhead and unable to perform repetitive tasks  Goals  Plan Goals: STG: to be met within 6 visits   1. Pt to show (I) with initial HEP  2. Pt to show full PROM of (R) shoulder with minimal increase in pain   3. Pt to show minimal issues with AAROM in shoulder     LTG: to be met by DC   1. Pt to be (I) with finalized HEP  2. Pt to report decreased impairment per QuickDASH to less than 15% impairment.   3. Pt to show full functional strength in (R) shoulder with minimal increase in pain   4. Pt to return to all normal ADLs and recreational activities with minimal increase in pain  5. No issues with sleeping d/t (R) shoulder pain     Plan  Therapy options: will be seen for skilled physical therapy services  Planned modality interventions:  cryotherapy, thermotherapy (hydrocollator packs) and dry needling  Planned therapy interventions: manual therapy, neuromuscular re-education, soft tissue mobilization, spinal/joint mobilization, strengthening, stretching, therapeutic activities, joint mobilization, home exercise program, functional ROM exercises, flexibility and body mechanics training  Frequency: 2x week  Duration in visits: 20  Treatment plan discussed with: patient        History # of Personal Factors and/or Comorbidities: MODERATE (1-2)  Examination of Body System(s): # of elements: LOW (1-2)  Clinical Presentation: STABLE   Clinical Decision Making: LOW     Timed:         Manual Therapy:    10     mins  01891;     Therapeutic Exercise:         mins  33578;     Neuromuscular Indio:        mins  25395;    Therapeutic Activity:     10     mins  08998;     Gait Training:           mins  11714;     Ultrasound:          mins  96850;    Ionto                                   mins   01896  Self Care                            mins   44322  Canalith Repos         mins 58970      Un-Timed:  Electrical Stimulation:         mins  39540 ( );  Traction          mins 10907  Dry Needle                 ______ mins DRYNDL  Low Eval     21     Mins  41054  Mod Eval          Mins  65942  High Eval                            Mins  57903  Re-Eval                               mins  40550        Timed Treatment:   20   mins   Total Treatment:     45   mins    PT SIGNATURE: Ashtyn Santana, PT   DATE TREATMENT INITIATED: 10/12/2020    Initial Certification  Certification Period: 1/10/2021  I certify that the therapy services are furnished while this patient is under my care.  The services outlined above are required by this patient, and will be reviewed every 90 days.     PHYSICIAN: Jeferson Plaza MD      DATE:     Please sign and return via fax to [phone].. Thank you, [facility].

## 2020-10-14 ENCOUNTER — TREATMENT (OUTPATIENT)
Dept: PHYSICAL THERAPY | Facility: CLINIC | Age: 55
End: 2020-10-14

## 2020-10-14 DIAGNOSIS — M75.111 NONTRAUMATIC INCOMPLETE TEAR OF RIGHT ROTATOR CUFF: ICD-10-CM

## 2020-10-14 DIAGNOSIS — Z98.890 S/P RIGHT ROTATOR CUFF REPAIR: Primary | ICD-10-CM

## 2020-10-14 PROCEDURE — 97140 MANUAL THERAPY 1/> REGIONS: CPT | Performed by: PHYSICAL THERAPIST

## 2020-10-14 PROCEDURE — 97530 THERAPEUTIC ACTIVITIES: CPT | Performed by: PHYSICAL THERAPIST

## 2020-10-14 NOTE — PROGRESS NOTES
Physical Therapy Daily Progress Note    VISIT#: 2    Subjective   Tonja Ledbetter reports: Sore today, is not sure she is doing the bar exercises right.       Objective     See Exercise, Manual, and Modality Logs for complete treatment.     Patient Education: proper form with abduction AAROM with bar in supine     Assessment/Plan  Pt with mod cues for form with abduction AAROM, able to perform correctly after cues from PT. Able to progress to pulleys in flexion to 90 deg. Ed pt to keep AAROM at 90 deg til next session, pt with good understanding.     Per MD - no RC strength til 12 weeks, scapular strength only til then     Progress per Plan of Care        Timed:         Manual Therapy:    16     mins  65913;     Therapeutic Exercise:         mins  63441;     Neuromuscular Indio:        mins  27434;    Therapeutic Activity:     18     mins  47553;     Gait Training:           mins  99521;     Ultrasound:          mins  14490;    Ionto                                   mins   24026  Self Care                            mins   98102  Canalith Repos                   mins  17752    Un-Timed:  Electrical Stimulation:         mins  03388 ( );  Traction          mins 22534  Dry Needle                 ______ mins DRYNDL  Low Eval          Mins  75756  Mod Eval          Mins  16948  High Eval                            Mins  71230  Re-Eval                               mins  14126    Timed Treatment:   34   mins   Total Treatment:     35   mins    Ashtyn Santana PT    Physical Therapist

## 2020-10-22 ENCOUNTER — TREATMENT (OUTPATIENT)
Dept: PHYSICAL THERAPY | Facility: CLINIC | Age: 55
End: 2020-10-22

## 2020-10-22 DIAGNOSIS — S46.001A INJURY OF RIGHT ROTATOR CUFF, INITIAL ENCOUNTER: ICD-10-CM

## 2020-10-22 DIAGNOSIS — Z98.890 S/P RIGHT ROTATOR CUFF REPAIR: Primary | ICD-10-CM

## 2020-10-22 DIAGNOSIS — M75.111 NONTRAUMATIC INCOMPLETE TEAR OF RIGHT ROTATOR CUFF: ICD-10-CM

## 2020-10-22 DIAGNOSIS — S46.001A ROTATOR CUFF INJURY, RIGHT, INITIAL ENCOUNTER: ICD-10-CM

## 2020-10-22 PROCEDURE — 97530 THERAPEUTIC ACTIVITIES: CPT | Performed by: PHYSICAL THERAPIST

## 2020-10-22 PROCEDURE — 97140 MANUAL THERAPY 1/> REGIONS: CPT | Performed by: PHYSICAL THERAPIST

## 2020-10-22 NOTE — PROGRESS NOTES
Physical Therapy Daily Progress Note    VISIT#: 3   Date of surgery: 8/27/2020 (Massive  (R) RCR - supraspinatus, infraspinatus, subscapularis by Dr. Plaza)   Precautions: Follow protocol, h/o breast CA within last year (no e-stim)    Subjective   Tonja Ledbetter reports she was holding some wine glasses in her arm against her body yesterday.  She is now sore today. Took a Tramadol before coming.     Pain Rating (0/10): 4    Objective     See Exercise, Manual, and Modality Logs for complete treatment.     Patient Education: discussed current activities to still perform and ROM limitations. Discussed importance of following protocol/adhering to restrictions as prescribed.     Assessment/Plan   Pt reaching ROM within limitations without difficulty today.  Was not too sore post session and was able to perform additional AAROM today.  She is independent with current HEP.      Plan  Progress per Plan of Care/protocol (pt is 8 weeks post op - no passive ER beyond 60 degs, flex/scaption as db., no strengthening until 12 weeks).             Timed:         Manual Therapy:    15     mins  09062;     Therapeutic Exercise:        mins  40992;     Neuromuscular Indio:        mins  71009;    Therapeutic Activity:     23     mins  87176;     Gait Training:           mins  13892;     Ultrasound:          mins  44892;    Ionto                                   mins   26602  Self Care                            mins   99631    Un-Timed:  Electrical Stimulation:         mins  65736 ( );  Dry Needling          mins self-pay  Traction          mins 63213  Low Eval          Mins  46891  Mod Eval          Mins  12204  High Eval                            Mins  96023  Re-Eval                               mins  54034    Timed Treatment:   38   mins   Total Treatment:     38   mins    Katlin Turner, PT, DPT  Physical Therapist

## 2020-10-29 ENCOUNTER — TREATMENT (OUTPATIENT)
Dept: PHYSICAL THERAPY | Facility: CLINIC | Age: 55
End: 2020-10-29

## 2020-10-29 DIAGNOSIS — Z98.890 S/P RIGHT ROTATOR CUFF REPAIR: Primary | ICD-10-CM

## 2020-10-29 PROCEDURE — 97140 MANUAL THERAPY 1/> REGIONS: CPT | Performed by: PHYSICAL THERAPIST

## 2020-10-29 NOTE — PROGRESS NOTES
Physical Therapy Daily Progress Note    VISIT#: 4/16 in POC.     Subjective   Tonja Ledbetter reports: Pain all through R side of neck, shoulder, scapula and R lateral chest.  States that it seems to be getting worse.  She is  Very concerned about possibly injuring R shoulder so she called and made an appt. With doctor who she will see tomorrow.       Objective     See Exercise, Manual, and Modality Logs for complete treatment.     Patient Education:  Relationship of shoulder and scapula.  Possible reasons for increased pain.     Assessment/Plan:  Focus on relieving pain.  PROM improved from evaluation.  No obvious issues to indicate re-tear of R shoulder muscles or surgical site damage.       Progress per Plan of Care:  Check results of MD appointment.             Timed:         Manual Therapy:     35    mins  10024;     Therapeutic Exercise:         mins  97999;     Neuromuscular Indio:        mins  21434;    Therapeutic Activity:          mins  46529;     Gait Training:           mins  61682;     Ultrasound:          mins  78664;    Ionto                                   mins   62619  Self Care                            mins   88631  Canalith Repos                   mins  4209  Aquatic                               mins 48151    Un-Timed:  Electrical Stimulation:         mins  49036 ( );  Dry Needling          mins self-pay  Traction          mins 99742  Low Eval          Mins  24420  Mod Eval          Mins  18604  High Eval                            Mins  99580  Re-Eval                               mins  43516    Timed Treatment:   35   mins   Total Treatment:     35   mins    Roselyn Dotson PTA

## 2020-11-05 ENCOUNTER — TREATMENT (OUTPATIENT)
Dept: PHYSICAL THERAPY | Facility: CLINIC | Age: 55
End: 2020-11-05

## 2020-11-05 DIAGNOSIS — Z98.890 S/P RIGHT ROTATOR CUFF REPAIR: Primary | ICD-10-CM

## 2020-11-05 PROCEDURE — 97110 THERAPEUTIC EXERCISES: CPT | Performed by: PHYSICAL THERAPIST

## 2020-11-05 PROCEDURE — 97140 MANUAL THERAPY 1/> REGIONS: CPT | Performed by: PHYSICAL THERAPIST

## 2020-11-05 NOTE — PROGRESS NOTES
Physical Therapy Daily Progress Note    VISIT#: 5/16 in POC.   Date of surgery: 8/27/2020 (Massive  (R) RCR - supraspinatus, infraspinatus, subscapularis by Dr. Plaza) .  no passive ER beyond 60 degs, flex/scaption as db., no strengthening until 12 weeks).  As of 11/5/20-10 weeks post  Precautions: Follow protocol, h/o breast CA within last year (no e-stim)  scap strength only.   Dr. Plaza    Subjective   Tonja Ledbetter reports:Pain much better today as she allowed her shoulder to rest some the last few days.  Current pain level 0/10, with movement 3/10 in L upper arm musculature.   She saw doctor and there are no re-tears.  This has made her feel much better.     Objective   Pt. Has to leave early due to another appt.     See Exercise, Manual, and Modality Logs for complete treatment.   Added UT stretches.     Patient Education:  Avoid using UT compensation    Assessment/Plan:  Pt. Tolerance to therapy improved today.  Palpable tension in B UT, L greater than right.  Stretching helped.       Progress per Plan of Care:             Timed:         Manual Therapy:     15    mins  22650;     Therapeutic Exercise:     25    mins  62531;     Neuromuscular Indio:        mins  93171;    Therapeutic Activity:          mins  80211;     Gait Training:           mins  25393;     Ultrasound:          mins  94530;    Ionto                                   mins   50116  Self Care                            mins   35297  Canalith Repos                   mins  4209  Aquatic                               mins 60976    Un-Timed:  Electrical Stimulation:         mins  42990 ( );  Dry Needling          mins self-pay  Traction          mins 13324  Low Eval          Mins  60055  Mod Eval          Mins  72164  High Eval                            Mins  92200  Re-Eval                               mins  41078    Timed Treatment:   40   mins   Total Treatment:     40   mins    Roselyn Dotson, BRYAN

## 2020-11-09 ENCOUNTER — TREATMENT (OUTPATIENT)
Dept: PHYSICAL THERAPY | Facility: CLINIC | Age: 55
End: 2020-11-09

## 2020-11-09 DIAGNOSIS — M75.111 NONTRAUMATIC INCOMPLETE TEAR OF RIGHT ROTATOR CUFF: ICD-10-CM

## 2020-11-09 DIAGNOSIS — Z98.890 S/P RIGHT ROTATOR CUFF REPAIR: Primary | ICD-10-CM

## 2020-11-09 DIAGNOSIS — S46.001A ROTATOR CUFF INJURY, RIGHT, INITIAL ENCOUNTER: ICD-10-CM

## 2020-11-09 DIAGNOSIS — S46.001A INJURY OF RIGHT ROTATOR CUFF, INITIAL ENCOUNTER: ICD-10-CM

## 2020-11-09 PROCEDURE — 97140 MANUAL THERAPY 1/> REGIONS: CPT | Performed by: PHYSICAL THERAPIST

## 2020-11-09 PROCEDURE — 97110 THERAPEUTIC EXERCISES: CPT | Performed by: PHYSICAL THERAPIST

## 2020-11-10 NOTE — PROGRESS NOTES
Physical Therapy Daily Progress Note    VISIT#: 6/16 in POC.   Date of surgery: 8/27/2020 (Massive  (R) RCR - supraspinatus, infraspinatus, subscapularis by Dr. Plaza) .  no passive ER beyond 60 degs, flex/scaption as db., no strengthening until 12 weeks).  As of 11/9/20-10 weeks post  Precautions: Follow protocol, h/o breast CA within last year (no e-stim)  scap strength only.   Dr. Plaza    Subjective   Tonja Ledbetter reports she is doing ok.  Feeling better that she has not been using it as much.      Pain Rating (0/10): 3    Objective     See Exercise, Manual, and Modality Logs for complete treatment.     Patient Education: Discussed with pt importance of scapular activation prior to AAROM/AROM exercises.  Went over supine punch AROM for HEP with slow controlled motion.    Assessment/Plan  Patient with improving ROM.  Still limited at this time per protocol.  Gets crepitus during ROM exercises, but no pain increase.     Plan  Progress per Plan of Care/protocol.           Timed:         Manual Therapy:    15     mins  92362;     Therapeutic Exercise:   25     mins  92828;     Neuromuscular Indio:        mins  66825;    Therapeutic Activity:         mins  74005;     Gait Training:           mins  10667;     Ultrasound:          mins  97242;    Ionto                                   mins   16268  Self Care                            mins   14912    Un-Timed:  Electrical Stimulation:         mins  23093 ( );  Dry Needling          mins self-pay  Traction          mins 33659  Low Eval          Mins  60827  Mod Eval          Mins  37289  High Eval                            Mins  66099  Re-Eval                               mins  88503    Timed Treatment:   40   mins   Total Treatment:     40   mins    Katlin Turner, PT, DPT  Physical Therapist

## 2020-11-11 ENCOUNTER — TELEPHONE (OUTPATIENT)
Dept: ORTHOPEDICS | Facility: OTHER | Age: 55
End: 2020-11-11

## 2020-11-11 ENCOUNTER — TREATMENT (OUTPATIENT)
Dept: PHYSICAL THERAPY | Facility: CLINIC | Age: 55
End: 2020-11-11

## 2020-11-11 DIAGNOSIS — S46.001A INJURY OF RIGHT ROTATOR CUFF, INITIAL ENCOUNTER: ICD-10-CM

## 2020-11-11 DIAGNOSIS — Z98.890 S/P RIGHT ROTATOR CUFF REPAIR: Primary | ICD-10-CM

## 2020-11-11 DIAGNOSIS — S46.001A ROTATOR CUFF INJURY, RIGHT, INITIAL ENCOUNTER: ICD-10-CM

## 2020-11-11 DIAGNOSIS — M75.111 NONTRAUMATIC INCOMPLETE TEAR OF RIGHT ROTATOR CUFF: ICD-10-CM

## 2020-11-11 PROCEDURE — 97110 THERAPEUTIC EXERCISES: CPT | Performed by: PHYSICAL THERAPIST

## 2020-11-11 PROCEDURE — 97140 MANUAL THERAPY 1/> REGIONS: CPT | Performed by: PHYSICAL THERAPIST

## 2020-11-11 NOTE — TELEPHONE ENCOUNTER
PATIENT HAD APPT FOR 11/12 AND WANTED TO COME TODAY SO SHE WAS SCHEDULED FOR TODAY @ 3:15 AND CANCELLED FOR TOMORROW

## 2020-11-11 NOTE — PROGRESS NOTES
Physical Therapy Daily Progress Note    VISIT#: 7    Subjective   Tonja Ledbetter reports she fell yesterday while walking.  She did not take full impact through R shoulder, and pulled it in towards her quickly after taking slight pressure through it.  It has been more sore since, and she is having some difficulty sleeping.  She is concerned that she tore it again.  She has a f/u with her surgeon already scheduled 11/20/2020.      Pain Rating (0/10): 7    Objective     See Exercise, Manual, and Modality Logs for complete treatment.     Patient Education:     R shoulder assessment:   SS - able to lift off of body w/out compensation/shoulder hiking   Empty can and drop arm: negative   Passive assessment: pt still has some end range resistance without too much give re: joint mobility.     Assessment/Plan  Patient with increased soreness/pain today 2' recent fall.  Per assessment it does not seem that patient re-tore her repair.  Gentle STM/PROM/assessment performed today.  Pt to rest/baby her shoulder/arm for a few days as well as ice.  Instructed to perform pulleys/gentle ROM as she is able to see how her arm responds.  If she has continued worsening pain despite resting, she is to call surgeon to get into see.    Plan  Progress per Plan of Care and monitor symptoms.             Timed:         Manual Therapy:    13    mins  80415;     Therapeutic Exercise:    15     mins  21918;     Neuromuscular Indio:        mins  96370;    Therapeutic Activity:          mins  84583;     Gait Training:           mins  88468;     Ultrasound:          mins  41857;    Ionto                                   mins   26085  Self Care                            mins   83059    Un-Timed:  Electrical Stimulation:         mins  63702 ( );  Dry Needling          mins self-pay  Traction          mins 43173  Low Eval          Mins  79359  Mod Eval          Mins  04136  High Eval                            Mins  92064  Re-Eval                                mins  30244    Timed Treatment:   28   mins   Total Treatment:     28   mins    Katlin Turner, PT, DPT  Physical Therapist

## 2020-11-16 ENCOUNTER — TREATMENT (OUTPATIENT)
Dept: PHYSICAL THERAPY | Facility: CLINIC | Age: 55
End: 2020-11-16

## 2020-11-16 DIAGNOSIS — Z98.890 S/P RIGHT ROTATOR CUFF REPAIR: Primary | ICD-10-CM

## 2020-11-16 PROCEDURE — 97140 MANUAL THERAPY 1/> REGIONS: CPT | Performed by: PHYSICAL THERAPIST

## 2020-11-16 PROCEDURE — 97110 THERAPEUTIC EXERCISES: CPT | Performed by: PHYSICAL THERAPIST

## 2020-11-16 NOTE — PROGRESS NOTES
Physical Therapy Daily Progress Note    VISIT#: 8/20 in POC.   Date of surgery: 8/27/2020 (Massive  (R) RCR - supraspinatus, infraspinatus, subscapularis by Dr. Plaza) .  no passive ER beyond 60 degs, flex/scaption as db., no strengthening until 12 weeks).  As of 11/12/ weeks post  Precautions: Follow protocol, h/o breast CA within last year (no e-stim)  scap strength only.   Dr. Plaza    Subjective   Tonja Anatoly reports:   Denies pain.     Objective        See Exercise, Manual, and Modality Logs for complete treatment.   Progression per protocol.     Patient Education:  HEP progressed and issued, see chart.     Exercises  Isometric Shoulder Flexion at Wall - 10 reps - 1 sets - 5 sec hold - 1x daily - 7x weekly  Isometric Shoulder Abduction at Wall - 10 reps - 1 sets - 5 sec hold - 1x daily - 7x weekly  Isometric Shoulder Extension at Wall - 10 reps - 1 sets - 5 sec hold - 1x daily - 7x weekly  Standing Isometric Shoulder Internal Rotation with Towel Roll at Doorway - 10 reps - 1 sets - 5 sec hold - 1x daily - 7x weekly  Standing Isometric Shoulder External Rotation with Doorway and Towel Roll - 10 reps - 1 sets - 5 sec hold - 1x daily - 7x weekly      Assessment/Plan:  Pt. Able to progress without issue.  Palpable and audible crepitus in R shoulder with motion over 90 degrees, no pain.       Progress per Plan of Care: monitor response, continue as appropriate.             Timed:         Manual Therapy:     15    mins  28258;     Therapeutic Exercise:     30    mins  69024;     Neuromuscular Indio:        mins  68622;    Therapeutic Activity:          mins  13484;     Gait Training:           mins  60230;     Ultrasound:          mins  32542;    Ionto                                   mins   07399  Self Care                            mins   41742  Canalith Repos                   mins  4209  Aquatic                               mins 31223    Un-Timed:  Electrical Stimulation:         mins  63847 (  );  Dry Needling          mins self-pay  Traction          mins 51249  Low Eval          Mins  75944  Mod Eval          Mins  56178  High Eval                            Mins  25426  Re-Eval                               mins  52772    Timed Treatment:   45   mins   Total Treatment:     45   mins    Roselyn Dotson, PTA

## 2020-11-19 ENCOUNTER — TREATMENT (OUTPATIENT)
Dept: PHYSICAL THERAPY | Facility: CLINIC | Age: 55
End: 2020-11-19

## 2020-11-19 DIAGNOSIS — Z98.890 S/P RIGHT ROTATOR CUFF REPAIR: Primary | ICD-10-CM

## 2020-11-19 PROCEDURE — 97110 THERAPEUTIC EXERCISES: CPT | Performed by: PHYSICAL THERAPIST

## 2020-11-19 PROCEDURE — 97140 MANUAL THERAPY 1/> REGIONS: CPT | Performed by: PHYSICAL THERAPIST

## 2020-11-19 NOTE — PROGRESS NOTES
Physical Therapy Daily Progress Note    VISIT#: 8/20 in POC.   Date of surgery: 8/27/2020 (Massive  (R) RCR - supraspinatus, infraspinatus, subscapularis by Dr. Plaza) .  no passive ER beyond 60 degs, flex/scaption as db., no strengthening until 12 weeks).  As of 11/19/20-12 weeks post  Precautions: Follow protocol, h/o breast CA within last year (no e-stim)  scap strength only.   Dr. Plaza 11/20/20    Subjective   Tonja Ledbetter reports:   Denies pain currently.  Pain with motion. Patient reports that she did well after previous session with  progressing to isometrics but next 2 days she was very sore.  She admits that she was using as much of her strength as possible when doing them.     Objective        See Exercise, Manual, and Modality Logs for complete treatment.       Patient Education:   Delayed onset muscle soreness after exercise.  Isometrics 25-50% of strength.         Assessment/Plan:  Pt. Has a lot of anxiety in regards to pain and believing that when she has pain it means that something is wrong. In spite of re-assurance that what she is experiencing is normal, she has become fearful of movement and change.  Her R UE musculature including UT's is very tight and tender.     Progress per Plan of Care: monitor response, resume isometrics.             Timed:         Manual Therapy:     15    mins  04258;     Therapeutic Exercise:     30    mins  77868;     Neuromuscular Indio:        mins  70479;    Therapeutic Activity:          mins  55286;     Gait Training:           mins  76593;     Ultrasound:          mins  24299;    Ionto                                   mins   82324  Self Care                            mins   75548  Canalith Repos                   mins  4209  Aquatic                               mins 73350    Un-Timed:  Electrical Stimulation:         mins  44882 ( );  Dry Needling          mins self-pay  Traction          mins 98008  Low Eval          Mins  70031  Mod Eval           Mins  81082  High Eval                            Mins  82900  Re-Eval                               mins  29007    Timed Treatment:   45   mins   Total Treatment:     45   mins    Roselyn Dotson, PTA

## 2020-11-23 ENCOUNTER — TREATMENT (OUTPATIENT)
Dept: PHYSICAL THERAPY | Facility: CLINIC | Age: 55
End: 2020-11-23

## 2020-11-23 DIAGNOSIS — S46.001A ROTATOR CUFF INJURY, RIGHT, INITIAL ENCOUNTER: ICD-10-CM

## 2020-11-23 DIAGNOSIS — S46.001A INJURY OF RIGHT ROTATOR CUFF, INITIAL ENCOUNTER: ICD-10-CM

## 2020-11-23 DIAGNOSIS — Z98.890 S/P RIGHT ROTATOR CUFF REPAIR: Primary | ICD-10-CM

## 2020-11-23 DIAGNOSIS — M75.111 NONTRAUMATIC INCOMPLETE TEAR OF RIGHT ROTATOR CUFF: ICD-10-CM

## 2020-11-23 PROCEDURE — 97110 THERAPEUTIC EXERCISES: CPT | Performed by: PHYSICAL THERAPIST

## 2020-11-23 PROCEDURE — 97140 MANUAL THERAPY 1/> REGIONS: CPT | Performed by: PHYSICAL THERAPIST

## 2020-11-23 NOTE — PROGRESS NOTES
Physical Therapy Daily Progress Note    VISIT#: 10    Subjective   Tonja Ledbetter reports she met with her surgeon.  They did additional testing on her, and did not think that she re-tore her RC.  She is not as in much pain today.  Got new orders that she can begin strengthening 1-2 # and advance as tolerated, and pt can perform ROM as tolerated.     Pain Rating (0/10): 3    Objective     See Exercise, Manual, and Modality Logs for complete treatment.     Patient Education: Educated pt on resuming isometrics and prone rowing at home. Discussed not letting pain get >4-5/10.     Assessment/Plan  Patient with slight increase in pain with isometrics and cane flexion exercises.  Continues to have some popping w/shoulder elevation that is repetitive with above shoulder level activities.  Not to painful at end of session.     Plan  Progress per Plan of Care per protocol/new order.            Timed:         Manual Therapy:    17     mins  20742;     Therapeutic Exercise:    23     mins  42089;     Neuromuscular Indio:        mins  76280;    Therapeutic Activity:          mins  88143;     Gait Training:           mins  61623;     Ultrasound:          mins  47341;    Ionto                                   mins   13276  Self Care                            mins   95362    Un-Timed:  Electrical Stimulation:         mins  90981 ( );  Dry Needling          mins self-pay  Traction          mins 07690  Low Eval          Mins  97816  Mod Eval          Mins  27650  High Eval                            Mins  20167  Re-Eval                               mins  71215    Timed Treatment:   40   mins   Total Treatment:     40   mins    Katlin Turner, PT, DPT  Physical Therapist

## 2020-11-30 ENCOUNTER — TREATMENT (OUTPATIENT)
Dept: PHYSICAL THERAPY | Facility: CLINIC | Age: 55
End: 2020-11-30

## 2020-11-30 DIAGNOSIS — Z98.890 S/P RIGHT ROTATOR CUFF REPAIR: Primary | ICD-10-CM

## 2020-11-30 PROCEDURE — 97140 MANUAL THERAPY 1/> REGIONS: CPT | Performed by: PHYSICAL THERAPIST

## 2020-11-30 PROCEDURE — 97110 THERAPEUTIC EXERCISES: CPT | Performed by: PHYSICAL THERAPIST

## 2020-11-30 NOTE — PROGRESS NOTES
Physical Therapy Daily Progress Note    VISIT#: 8/20 in POC.   Date of surgery: 8/27/2020 (Massive  (R) RCR - supraspinatus, infraspinatus, subscapularis by Dr. Plaza) .  no passive ER beyond 60 degs, flex/scaption as db., no strengthening until 12 weeks).  As of 11/19/20-12 weeks post  Precautions: Follow protocol, h/o breast CA within last year (no e-stim)  scap strength only.   Dr. Plaza 1/21/    Subjective   Tonja Ledbetter reports:  No pain with motion, but with movement, pain goes up to a 5/10 in R biceps and R shoulder.     Objective    PATIENT LATE.    See Exercise, Manual, and Modality Logs for complete treatment.       Patient Education:   Scar tissue massage and given instructional handout, see chart.         Assessment/Plan:  Treatment abbreviated due to patient being late.     Progress per Plan of Care:             Timed:         Manual Therapy:     15    mins  11637;     Therapeutic Exercise:     20    mins  43039;     Neuromuscular Indio:        mins  56533;    Therapeutic Activity:          mins  68049;     Gait Training:           mins  92394;     Ultrasound:          mins  60559;    Ionto                                   mins   13686  Self Care                            mins   74108  Canalith Repos                   mins  4209  Aquatic                               mins 55466    Un-Timed:  Electrical Stimulation:         mins  40351 ( );  Dry Needling          mins self-pay  Traction          mins 12387  Low Eval          Mins  19733  Mod Eval          Mins  41679  High Eval                            Mins  56769  Re-Eval                               mins  48535    Timed Treatment:   35   mins   Total Treatment:     35   mins    Roselyn Dotson PTA

## 2020-12-02 ENCOUNTER — TREATMENT (OUTPATIENT)
Dept: PHYSICAL THERAPY | Facility: CLINIC | Age: 55
End: 2020-12-02

## 2020-12-02 DIAGNOSIS — Z98.890 S/P RIGHT ROTATOR CUFF REPAIR: Primary | ICD-10-CM

## 2020-12-02 DIAGNOSIS — S46.001A INJURY OF RIGHT ROTATOR CUFF, INITIAL ENCOUNTER: ICD-10-CM

## 2020-12-02 DIAGNOSIS — S46.001A ROTATOR CUFF INJURY, RIGHT, INITIAL ENCOUNTER: ICD-10-CM

## 2020-12-02 PROCEDURE — 97110 THERAPEUTIC EXERCISES: CPT | Performed by: PHYSICAL THERAPIST

## 2020-12-02 PROCEDURE — 97140 MANUAL THERAPY 1/> REGIONS: CPT | Performed by: PHYSICAL THERAPIST

## 2020-12-02 NOTE — PROGRESS NOTES
Physical Therapy Daily Progress Note    VISIT#: 12    Subjective   Tonja Ledbetter reports she is doing ok.  Thinks that more of her pain is more of soreness from working the muscles.     Pain Rating (0/10): 3    Objective     See Exercise, Manual, and Modality Logs for complete treatment.     Access Code: [email or web address]   URL: [email or web address]   Date: 12/02/2020   Prepared by: Katlin Turner     Exercises  Shoulder External Rotation Reactive Isometrics - 10 reps - 1 sets - 1x daily - 5x weekly  Shoulder Internal Rotation Reactive Isometrics - 10 reps - 1 sets - 1x daily - 5x weekly  Standing Bilateral Low Shoulder Row with Anchored Resistance - 10 reps - 2 sets - 1x daily - 5x weekly    Assessment/Plan  Patient with difficulty performing ER isometric with band as it was difficult.  Pt had reports of just feeling the muscle working and made her a little sore.      Plan  Progress per Plan of Care and Progress strengthening /stabilization /functional activity per protocol.             Timed:         Manual Therapy:    20     mins  60872;     Therapeutic Exercise:    25     mins  11683;     Neuromuscular Indio:        mins  08552;    Therapeutic Activity:          mins  59261;     Gait Training:           mins  17709;     Ultrasound:          mins  21797;    Ionto                                   mins   86129  Self Care                            mins   56754    Un-Timed:  Electrical Stimulation:         mins  08565 ( );  Dry Needling          mins self-pay  Traction          mins 33940  Low Eval          Mins  95000  Mod Eval          Mins  03530  High Eval                            Mins  90050  Re-Eval                               mins  24048    Timed Treatment:  45    mins   Total Treatment:     45   mins    Katlin Turner, PT, DPT  Physical Therapist

## 2020-12-07 ENCOUNTER — TREATMENT (OUTPATIENT)
Dept: PHYSICAL THERAPY | Facility: CLINIC | Age: 55
End: 2020-12-07

## 2020-12-07 DIAGNOSIS — Z98.890 S/P RIGHT ROTATOR CUFF REPAIR: Primary | ICD-10-CM

## 2020-12-07 PROCEDURE — 97140 MANUAL THERAPY 1/> REGIONS: CPT | Performed by: PHYSICAL THERAPIST

## 2020-12-07 PROCEDURE — 97110 THERAPEUTIC EXERCISES: CPT | Performed by: PHYSICAL THERAPIST

## 2020-12-07 NOTE — PROGRESS NOTES
Physical Therapy Daily Progress Note    VISIT#: 8/20 in POC.   Date of surgery: 8/27/2020 (Massive  (R) RCR - supraspinatus, infraspinatus, subscapularis by Dr. Plaza) .  no passive ER beyond 60 degs, flex/scaption as db., no strengthening until 12 weeks).  As of 11/26/20-13 weeks post  Precautions: Follow protocol, h/o breast CA within last year (no e-stim)  scap strength only.   Dr. Plaza 1/21/21    Subjective   Tonja Ledbetter reports: Pain 10/10 R shoulder and biceps.  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 She ended up having to take some pain pills.     Objective        See Exercise, Manual, and Modality Logs for complete treatment.       Patient Education:  HEP  Progressed and issued, see chart.     Exercises  Seated Scapular Retraction - 10 reps - 2 sets - 5 sec hold - 1x daily - 7x weekly  Standing Backward Shoulder Rolls - 10 reps - 1 sets - 1x daily - 7x weekly    Assessment/Plan:  R shoulder passively feels good/stable.  Muscles: UT, levator, serratus, deltoid and biceps tense and tender.  Focus on manual and stretching due to increase in pain level from overuse.      Progress per Plan of Care:             Timed:         Manual Therapy:     20    mins  17405;     Therapeutic Exercise:     25    mins  27578;     Neuromuscular Indio:        mins  05502;    Therapeutic Activity:          mins  46398;     Gait Training:           mins  84338;     Ultrasound:          mins  30341;    Ionto                                   mins   06977  Self Care                            mins   53783  Canalith Repos                   mins  4209  Aquatic                               mins 90720    Un-Timed:  Electrical Stimulation:         mins  93774 ( );  Dry Needling          mins self-pay  Traction          mins 79975  Low Eval          Mins  59300  Mod Eval          Mins  89022  High Eval                            Mins  59385  Re-Eval                                mins  37928    Timed Treatment:   45   mins   Total Treatment:     45   mins    Roselyn Dotson, PTA

## 2020-12-10 ENCOUNTER — TREATMENT (OUTPATIENT)
Dept: PHYSICAL THERAPY | Facility: CLINIC | Age: 55
End: 2020-12-10

## 2020-12-10 DIAGNOSIS — Z98.890 S/P RIGHT ROTATOR CUFF REPAIR: Primary | ICD-10-CM

## 2020-12-10 PROCEDURE — 97110 THERAPEUTIC EXERCISES: CPT | Performed by: PHYSICAL THERAPIST

## 2020-12-10 PROCEDURE — 97140 MANUAL THERAPY 1/> REGIONS: CPT | Performed by: PHYSICAL THERAPIST

## 2020-12-10 NOTE — PROGRESS NOTES
Physical Therapy Daily Progress Note    VISIT#: 8/20 in POC.   Date of surgery: 8/27/2020 (Massive  (R) RCR - supraspinatus, infraspinatus, subscapularis by Dr. Plaza) .  no passive ER beyond 60 degs, flex/scaption as db., no strengthening until 12 weeks).  As of 11/26/20-13 weeks post  Precautions: Follow protocol, h/o breast CA within last year (no e-stim)  scap strength only.   Dr. Plaza 1/21/21    Subjective   Tonja Ledbetter reports: she continues to worry that something is wrong with her shoulder.  She does not understand why she is so much pain and why shoulder feels like it doesn't move correctly.      Objective        See Exercise, Manual, and Modality Logs for complete treatment.   Progression as noted.     Patient Education: surgery, protocol, muscle weakness and symptoms.       Assessment/Plan:  Pt. Has a tendency to overdue HEP and home activities which in turns is increasing her discomfort/pain.  Pt. Warned against doing this.     Progress per Plan of Care:             Timed:         Manual Therapy:     20    mins  21272;     Therapeutic Exercise:     25    mins  08359;     Neuromuscular Indio:        mins  41510;    Therapeutic Activity:          mins  45381;     Gait Training:           mins  47155;     Ultrasound:          mins  05392;    Ionto                                   mins   95117  Self Care                            mins   41465  Canalith Repos                   mins  4209  Aquatic                               mins 56152    Un-Timed:  Electrical Stimulation:         mins  44233 ( );  Dry Needling          mins self-pay  Traction          mins 02232  Low Eval          Mins  68634  Mod Eval          Mins  56022  High Eval                            Mins  92200  Re-Eval                               mins  54699    Timed Treatment:   45   mins   Total Treatment:     45   mins    Roselyn Dotson PTA

## 2020-12-14 ENCOUNTER — TREATMENT (OUTPATIENT)
Dept: PHYSICAL THERAPY | Facility: CLINIC | Age: 55
End: 2020-12-14

## 2020-12-14 DIAGNOSIS — Z98.890 S/P RIGHT ROTATOR CUFF REPAIR: Primary | ICD-10-CM

## 2020-12-14 DIAGNOSIS — S46.001A ROTATOR CUFF INJURY, RIGHT, INITIAL ENCOUNTER: ICD-10-CM

## 2020-12-14 DIAGNOSIS — M75.111 NONTRAUMATIC INCOMPLETE TEAR OF RIGHT ROTATOR CUFF: ICD-10-CM

## 2020-12-14 DIAGNOSIS — S46.001A INJURY OF RIGHT ROTATOR CUFF, INITIAL ENCOUNTER: ICD-10-CM

## 2020-12-14 PROCEDURE — 97140 MANUAL THERAPY 1/> REGIONS: CPT | Performed by: PHYSICAL THERAPIST

## 2020-12-14 PROCEDURE — 97110 THERAPEUTIC EXERCISES: CPT | Performed by: PHYSICAL THERAPIST

## 2020-12-14 NOTE — PROGRESS NOTES
Physical Therapy Daily Progress Note    VISIT#: 15/20 in POC.   Date of surgery: 8/27/2020 (Massive  (R) RCR - supraspinatus, infraspinatus, subscapularis by Dr. Plaza) .  no passive ER beyond 60 degs, flex/scaption as db., no strengthening until 12 weeks).  As of 12/14/20-15 weeks post op  Precautions: Follow protocol, h/o breast CA within last year (no e-stim)  Dr. Plaza 1/21/21     Subjective   Tonja Ledbetter reports she did not have a great day yesterday.  She is doing ok today.  Is still noticing some popping in her shoulder.  Feels that she has hit a plateau. Is not sure if she should've had the surgery at times.     Pain Rating (0/10): 3    Objective     See Exercise, Manual, and Modality Logs for complete treatment.     Patient Education: Instructed pt to add DA wall slide to HEP.  Progress to SA as able. Discussed very light biceps curl at home with no weight or only can of soup.     Assessment/Plan  Patient continues to have a small popping/clunking feeling in her shoulder with passive range. Actively today pt tended to hike up her shoulder and overactivate UT; however, pt is still very weak in R shoulder.  Progressed some today, but she is still limited and was sore end of session.     Plan  Progress per Plan of Care/protocol.  Consider tricep pressdown next visit with yellow tband. Assess response to last visit.             Timed:         Manual Therapy:    20     mins  98797;     Therapeutic Exercise:    25     mins  89169;     Neuromuscular Indio:        mins  48846;    Therapeutic Activity:          mins  64683;     Gait Training:           mins  50925;     Ultrasound:          mins  25729;    Ionto                                   mins   90881  Self Care                            mins   13869    Un-Timed:  Electrical Stimulation:         mins  19011 ( );  Dry Needling          mins self-pay  Traction          mins 96244  Low Eval          Mins  63370  Mod Eval          Mins  52358  High  Eval                            Mins  66563  Re-Eval                               mins  27937    Timed Treatment:   45   mins   Total Treatment:     45   mins    Katlin Turner, PT, DPT  Physical Therapist

## 2020-12-17 ENCOUNTER — TREATMENT (OUTPATIENT)
Dept: PHYSICAL THERAPY | Facility: CLINIC | Age: 55
End: 2020-12-17

## 2020-12-17 DIAGNOSIS — S46.001A ROTATOR CUFF INJURY, RIGHT, INITIAL ENCOUNTER: ICD-10-CM

## 2020-12-17 DIAGNOSIS — Z98.890 S/P RIGHT ROTATOR CUFF REPAIR: Primary | ICD-10-CM

## 2020-12-17 DIAGNOSIS — M75.111 NONTRAUMATIC INCOMPLETE TEAR OF RIGHT ROTATOR CUFF: ICD-10-CM

## 2020-12-17 DIAGNOSIS — S46.001A INJURY OF RIGHT ROTATOR CUFF, INITIAL ENCOUNTER: ICD-10-CM

## 2020-12-17 PROCEDURE — 97110 THERAPEUTIC EXERCISES: CPT | Performed by: PHYSICAL THERAPIST

## 2020-12-17 PROCEDURE — 97140 MANUAL THERAPY 1/> REGIONS: CPT | Performed by: PHYSICAL THERAPIST

## 2020-12-21 ENCOUNTER — TREATMENT (OUTPATIENT)
Dept: PHYSICAL THERAPY | Facility: CLINIC | Age: 55
End: 2020-12-21

## 2020-12-21 DIAGNOSIS — Z98.890 S/P RIGHT ROTATOR CUFF REPAIR: Primary | ICD-10-CM

## 2020-12-21 PROCEDURE — 97140 MANUAL THERAPY 1/> REGIONS: CPT | Performed by: PHYSICAL THERAPIST

## 2020-12-21 PROCEDURE — 97110 THERAPEUTIC EXERCISES: CPT | Performed by: PHYSICAL THERAPIST

## 2020-12-21 PROCEDURE — 97112 NEUROMUSCULAR REEDUCATION: CPT | Performed by: PHYSICAL THERAPIST

## 2020-12-21 NOTE — PROGRESS NOTES
Physical Therapy Daily Progress Note    VISIT#: 8/20 in POC.   Date of surgery: 8/27/2020 (Massive  (R) RCR - supraspinatus, infraspinatus, subscapularis by Dr. Plaza) .  no passive ER beyond 60 degs, flex/scaption as db., no strengthening until 12 weeks).  As of 12/17/ weeks post  Precautions: Follow protocol, h/o breast CA within last year (no e-stim)  scap strength only.   Dr. Plaza 1/21/21    Subjective   Tonja Ledbetter reports:   R shoulder doing better. Pain 3/10.   Objective        See Exercise, Manual, and Modality Logs for complete treatment.   Progression as noted.     Patient Education:       Assessment/Plan:  High rows increased pain so modified to low rows without increase in symptoms.     Progress per Plan of Care:             Timed:         Manual Therapy:     15    mins  99355;     Therapeutic Exercise:     20    mins  62164;     Neuromuscular Indio:  10      mins  99886;    Therapeutic Activity:          mins  77123;     Gait Training:           mins  04698;     Ultrasound:          mins  75398;    Ionto                                   mins   76630  Self Care                            mins   20306  Canalith Repos                   mins  4209  Aquatic                               mins 96105    Un-Timed:  Electrical Stimulation:         mins  63765 ( );  Dry Needling          mins self-pay  Traction          mins 44729  Low Eval          Mins  86321  Mod Eval          Mins  53904  High Eval                            Mins  14583  Re-Eval                               mins  09742    Timed Treatment:   45   mins   Total Treatment:     45   mins    Roselyn Dotson PTA

## 2020-12-23 ENCOUNTER — TREATMENT (OUTPATIENT)
Dept: PHYSICAL THERAPY | Facility: CLINIC | Age: 55
End: 2020-12-23

## 2020-12-23 DIAGNOSIS — S46.001A INJURY OF RIGHT ROTATOR CUFF, INITIAL ENCOUNTER: Primary | ICD-10-CM

## 2020-12-23 DIAGNOSIS — S46.001A ROTATOR CUFF INJURY, RIGHT, INITIAL ENCOUNTER: ICD-10-CM

## 2020-12-23 DIAGNOSIS — Z98.890 S/P RIGHT ROTATOR CUFF REPAIR: ICD-10-CM

## 2020-12-23 DIAGNOSIS — M75.111 NONTRAUMATIC INCOMPLETE TEAR OF RIGHT ROTATOR CUFF: ICD-10-CM

## 2020-12-23 PROCEDURE — 97140 MANUAL THERAPY 1/> REGIONS: CPT | Performed by: PHYSICAL THERAPIST

## 2020-12-23 PROCEDURE — 97110 THERAPEUTIC EXERCISES: CPT | Performed by: PHYSICAL THERAPIST

## 2020-12-23 NOTE — PROGRESS NOTES
Physical Therapy Daily Progress Note    VISIT#: 18    Subjective   Tonja Ledbetter reports she is doing ok today.  Went on a hayride yesterday and she is sore all over just from getting on/off.  Shoulder not doing too bad today.     Pain Rating (0/10): 3    Objective     See Exercise, Manual, and Modality Logs for complete treatment.     Assessment/Plan  Patient progressing well today with no significant pain increase.  She demonstrates improving tolerance to exercises.  Still presents with ROM restrictions slightly,but mainly due to pain.    Plan  Progress per Plan of Care and Progress strengthening /stabilization /functional activity/ROM per prot          Timed:         Manual Therapy:    15     mins  53781;     Therapeutic Exercise:    30     mins  62780;     Neuromuscular Indio:        mins  51489;    Therapeutic Activity:          mins  72666;     Gait Training:           mins  91781;     Ultrasound:          mins  17631;    Ionto                                   mins   03332  Self Care                            mins   71254    Un-Timed:  Electrical Stimulation:         mins  83933 ( );  Dry Needling          mins self-pay  Traction          mins 18040  Low Eval          Mins  44141  Mod Eval          Mins  85584  High Eval                            Mins  64573  Re-Eval                               mins  16930    Timed Treatment:   45   mins   Total Treatment:     45   mins    Katlin Turner, PT, DPT  Physical Therapist

## 2020-12-29 ENCOUNTER — TREATMENT (OUTPATIENT)
Dept: PHYSICAL THERAPY | Facility: CLINIC | Age: 55
End: 2020-12-29

## 2020-12-29 DIAGNOSIS — M75.111 NONTRAUMATIC INCOMPLETE TEAR OF RIGHT ROTATOR CUFF: ICD-10-CM

## 2020-12-29 DIAGNOSIS — S46.001A INJURY OF RIGHT ROTATOR CUFF, INITIAL ENCOUNTER: Primary | ICD-10-CM

## 2020-12-29 DIAGNOSIS — S46.001A ROTATOR CUFF INJURY, RIGHT, INITIAL ENCOUNTER: ICD-10-CM

## 2020-12-29 DIAGNOSIS — Z98.890 S/P RIGHT ROTATOR CUFF REPAIR: ICD-10-CM

## 2020-12-29 PROCEDURE — 97140 MANUAL THERAPY 1/> REGIONS: CPT | Performed by: PHYSICAL THERAPIST

## 2020-12-29 PROCEDURE — 97110 THERAPEUTIC EXERCISES: CPT | Performed by: PHYSICAL THERAPIST

## 2020-12-29 NOTE — PROGRESS NOTES
Physical Therapy Daily Progress Note    VISIT#: 19    Subjective   Tonja Ledbetter reports she is a little more sore today.  Thinks that just having the holidays and carrying things she is more sore.     Pain Rating (0/10): 3    Objective     See Exercise, Manual, and Modality Logs for complete treatment.     Patient education: Discussed with pt adding standing shoulder scaption slide on wall for home as well as shoulder IR/ER w/theraband.     Assessment/Plan  Patient had difficulty with scaption against wall today.  Slightly sore beginning session but did well with progression and was only sore due to working muscles some but was not too high.     Plan  Reassess for POC update.          Timed:         Manual Therapy:   15      mins  86135;     Therapeutic Exercise:     30    mins  21300;     Neuromuscular Indio:        mins  40794;    Therapeutic Activity:          mins  13471;     Gait Training:           mins  57968;     Ultrasound:          mins  65856;    Ionto                                   mins   48946  Self Care                            mins   82636    Un-Timed:  Electrical Stimulation:         mins  17314 ( );  Dry Needling          mins self-pay  Traction          mins 84729  Low Eval          Mins  66911  Mod Eval          Mins  80489  High Eval                            Mins  68795  Re-Eval                               mins  35259    Timed Treatment:   45   mins   Total Treatment:     45   mins    Katlin Turner, PT, DPT  Physical Therapist

## 2021-01-04 ENCOUNTER — TREATMENT (OUTPATIENT)
Dept: PHYSICAL THERAPY | Facility: CLINIC | Age: 56
End: 2021-01-04

## 2021-01-04 DIAGNOSIS — S46.001A INJURY OF RIGHT ROTATOR CUFF, INITIAL ENCOUNTER: Primary | ICD-10-CM

## 2021-01-04 DIAGNOSIS — M75.111 NONTRAUMATIC INCOMPLETE TEAR OF RIGHT ROTATOR CUFF: ICD-10-CM

## 2021-01-04 DIAGNOSIS — S46.001A ROTATOR CUFF INJURY, RIGHT, INITIAL ENCOUNTER: ICD-10-CM

## 2021-01-04 DIAGNOSIS — Z98.890 S/P RIGHT ROTATOR CUFF REPAIR: ICD-10-CM

## 2021-01-04 PROCEDURE — 97140 MANUAL THERAPY 1/> REGIONS: CPT | Performed by: PHYSICAL THERAPIST

## 2021-01-04 PROCEDURE — 97110 THERAPEUTIC EXERCISES: CPT | Performed by: PHYSICAL THERAPIST

## 2021-01-04 NOTE — PROGRESS NOTES
Re-Assessment / Progress Note    Patient: Tonja Ledbetter   : 1965  Diagnosis/ICD-10 Code:  Injury of right rotator cuff, initial encounter [S46.001A]  Referring practitioner: Jeferson Plaza MD  Date of Initial Visit: Episode Type: THERAPY  Noted: 10/12/2020    Today's Date: 2021  Patient seen for 20 sessions.      Subjective:   Tonja Ledbetter reports: she has been sore.  She is feeling as though she had turned a corner, but now is feeling more soreness/pain into her R axillary region and shoulder. Her pain will sometimes refer down into her forearm.  She has been working on her Hep at home to continue to gain motion, but in general is still having good and bad days.  She hears popping/grinding in her shoulder at times when it gets higher.     Subjective Questionnaire: QuickDASH: 18% disability   Clinical Progress: improved  Home Program Compliance: Yes  Treatment has included: therapeutic exercise, manual therapy, therapeutic activity and cryotherapy    Subjective Evaluation    Pain  Current pain ratin  At best pain ratin  At worst pain ratin         Objective        Neurological Testing      Sensation      Shoulder   Left Shoulder   Intact: light touch     Right Shoulder   Intact: light touch    Palpation   Patient with tenderness along pectoralis musculature as well as in axillary region.  She has tenderness to anterior shoulder region into biceps tendon.      Active Range of Motion   Left Shoulder   Normal active range of motion  Flexion: 180 degrees   Abduction: 180 degrees   External rotation BTH: T1   Internal rotation BTB: L2     Right shoulder:   Flexion: 100 degrees with pain  External rotation 0°: 66 degrees   Internal rotation BTB: T10  Abduction: 90    Additional Active Range of Motion Details  (R) not tested d/t precautions      Passive Range of Motion      Right Shoulder   Flexion: 138 degrees with pain  External rotation 0°: 66 degrees   Internal rotation 0°:  degrees    Abduction:107 degrees    Additional Passive Range of Motion Details  scaption 90 deg - slight pain at end range      Strength  R shoulder:   Biceps: 4-/5  Triceps: 4-/5  Shoulder ER: 3-/5 (painful)   Shoulder IR: 3+/5 (within available range)  Shoulder abduction: 3-/5 (painful)  Shoulder flexion: 3-/5 (painful)    L shoulder: grossly 5/5     Assessment/Plan   Patient has shown progress in her ROM and RUE function.  She is continuing to have ups/downs with pain limiting her progress.  She has been progressed very slowly as her pain level will allow as well as pt tended to flare-up easily any time progressed per protocol/M.D. orders.  She is getting some grinding/popping during PROM and at times there is a clunk sensation in R shoulder with passive elevation.  She has continued pain with elevation actively also.  She will continue to benefit from skilled PT services as well as further assessment by M.FRANCES. as needed.        Progress toward previous goals: Partially Met    Goals  Plan Goals: STG: to be met within 6 visits   1. Pt to show (I) with initial HEP. - met  2. Pt to show full PROM of (R) shoulder with minimal increase in pain. - not met, progressing   3. Pt to show minimal issues with AAROM in shoulder. - not met, progressing     LTG: to be met by DC   1. Pt to be (I) with finalized HEP. - progressing   2. Pt to report decreased impairment per QuickDASH to less than 15% impairment. - not met   3. Pt to show full functional strength in (R) shoulder with minimal increase in pain. - not met   4. Pt to return to all normal ADLs and recreational activities with minimal increase in pain. - progressing w/ADLs, not met recreational activities   5. No issues with sleeping d/t (R) shoulder pain. - not met, progressing         Recommendations: Continue as planned with further shoulder assessment by M.D. at next scheduled appointment.   Timeframe: 2 visits per week x20 visits   Prognosis to achieve goals: good    PT  Signature: Katlin Turner, PT, DPT         Based upon review of the patient's progress and continued therapy plan, it is my medical opinion that Tonja Ledbetter should continue physical therapy treatment at [facility]  [address]  [place] [address]  [phone].    Signature: __________________________________  Jeferson Plaza MD    Timed:         Manual Therapy:   30     mins  89379;     Therapeutic Exercise:    15     mins  81467;     Neuromuscular Indio:        mins  18792;    Therapeutic Activity:          mins  87739;     Gait Training:           mins  82918;     Ultrasound:          mins  38134;    Ionto                                   mins   39820  Self Care                            mins   44851        Un-Timed:  Electrical Stimulation:         mins  83719 ( );  Dry Needling          mins self-pay  Traction          mins 89473  Low Eval          Mins  33741  Mod Eval          Mins  75373  High Eval                            Mins  15511  Re-Eval                               mins  28770        Timed Treatment:   45   mins   Total Treatment:     45   mins

## 2021-01-07 ENCOUNTER — TREATMENT (OUTPATIENT)
Dept: PHYSICAL THERAPY | Facility: CLINIC | Age: 56
End: 2021-01-07

## 2021-01-07 DIAGNOSIS — M75.111 NONTRAUMATIC INCOMPLETE TEAR OF RIGHT ROTATOR CUFF: ICD-10-CM

## 2021-01-07 DIAGNOSIS — S46.001A ROTATOR CUFF INJURY, RIGHT, INITIAL ENCOUNTER: ICD-10-CM

## 2021-01-07 DIAGNOSIS — Z98.890 S/P RIGHT ROTATOR CUFF REPAIR: ICD-10-CM

## 2021-01-07 DIAGNOSIS — S46.001A INJURY OF RIGHT ROTATOR CUFF, INITIAL ENCOUNTER: Primary | ICD-10-CM

## 2021-01-07 PROCEDURE — 97140 MANUAL THERAPY 1/> REGIONS: CPT | Performed by: PHYSICAL THERAPIST

## 2021-01-07 PROCEDURE — 97530 THERAPEUTIC ACTIVITIES: CPT | Performed by: PHYSICAL THERAPIST

## 2021-01-07 PROCEDURE — 97110 THERAPEUTIC EXERCISES: CPT | Performed by: PHYSICAL THERAPIST

## 2021-01-07 NOTE — PROGRESS NOTES
Physical Therapy Daily Progress Note    VISIT#: 21    Subjective   Tonja Ledbetter reports she is still having increased soreness in her shoulder and also in her breast tissue.  She had her  eduin on her breast with a permanent marker over the spot.  She is feeling an area of tightness that is sore.  She has an appt with her oncologist coming up as she is concerned about it and worried another CA spot is back.  She is going to her ortho doc tomorrow.     Pain Rating (0/10): 6    Objective     See Exercise, Manual, and Modality Logs for complete treatment.     Assessment of breast tissue/pect.  Felt an area of tightness that seems more muscular in nature.  No pea/marble feeling present.  Was tender to palpation.      Assessment/Plan  Patient still able to raise arm up above shoulder level. Largest limitation is still pt's pain; however, she is still showing some slight progress.     Plan  Progress per Plan of Care as tolerated per pt.  Discuss pt's M.D. appt.             Timed:         Manual Therapy:   13    mins  48787;     Therapeutic Exercise:    23    mins  59261;     Neuromuscular Indio:        mins  59548;    Therapeutic Activity:     8     mins  82822;     Gait Training:           mins  54590;     Ultrasound:          mins  88486;    Ionto                                   mins   87391  Self Care                            mins   44249    Un-Timed:  Electrical Stimulation:         mins  08556 ( );  Dry Needling          mins self-pay  Traction          mins 68931  Low Eval          Mins  29705  Mod Eval          Mins  00580  High Eval                            Mins  50720  Re-Eval                               mins  39304    Timed Treatment:   44   mins   Total Treatment:     44   mins    Katlin Turner, PT, DPT  Physical Therapist

## 2021-01-11 ENCOUNTER — TREATMENT (OUTPATIENT)
Dept: PHYSICAL THERAPY | Facility: CLINIC | Age: 56
End: 2021-01-11

## 2021-01-11 DIAGNOSIS — Z98.890 S/P RIGHT ROTATOR CUFF REPAIR: Primary | ICD-10-CM

## 2021-01-11 PROCEDURE — 97112 NEUROMUSCULAR REEDUCATION: CPT | Performed by: PHYSICAL THERAPIST

## 2021-01-11 PROCEDURE — 97110 THERAPEUTIC EXERCISES: CPT | Performed by: PHYSICAL THERAPIST

## 2021-01-11 PROCEDURE — 97140 MANUAL THERAPY 1/> REGIONS: CPT | Performed by: PHYSICAL THERAPIST

## 2021-01-11 NOTE — PROGRESS NOTES
Physical Therapy Daily Progress Note    VISIT#: 22/40 in POC.   Date of surgery: 8/27/2020 (Massive  (R) RCR - supraspinatus, infraspinatus, subscapularis by Dr. Plaza) .   As of 1/7/21-20 weeks post.   Precautions: Follow protocol, h/o breast CA within last year (no e-stim)  scap strength only.   Dr. Plaza 7/21    Subjective   Tonja Ledbetter reports:   Pain today in R ribs below lateral breast, 5/10. She is concerned about pain being so close to breast and will consult with oncologist 1/15/21.  She saw orthopedic who basically told her that symptoms/pain are to be expected due to massive tear.    Objective        See Exercise, Manual, and Modality Logs for complete treatment.   Progression as noted.     Patient Education: AROM R shoulder in mirror @ home, no shoulder hike.      Assessment/Plan: Pt. With limited ROM R shoulder and consistent shoulder hike, compensation with UT.     Progress per Plan of Care:             Timed:         Manual Therapy:     15    mins  91581;     Therapeutic Exercise:     20    mins  65745;     Neuromuscular Indio:  10      mins  73354;    Therapeutic Activity:          mins  67120;     Gait Training:           mins  20417;     Ultrasound:          mins  75348;    Ionto                                   mins   85898  Self Care                            mins   58201  Canalith Repos                   mins  4209  Aquatic                               mins 05618    Un-Timed:  Electrical Stimulation:         mins  99528 ( );  Dry Needling          mins self-pay  Traction          mins 85193  Low Eval          Mins  51107  Mod Eval          Mins  14902  High Eval                            Mins  02111  Re-Eval                               mins  98326    Timed Treatment:   45   mins   Total Treatment:     45   mins    Roselyn Dotson PTA

## 2021-01-14 ENCOUNTER — TREATMENT (OUTPATIENT)
Dept: PHYSICAL THERAPY | Facility: CLINIC | Age: 56
End: 2021-01-14

## 2021-01-14 DIAGNOSIS — Z98.890 S/P RIGHT ROTATOR CUFF REPAIR: Primary | ICD-10-CM

## 2021-01-14 PROCEDURE — 97140 MANUAL THERAPY 1/> REGIONS: CPT | Performed by: PHYSICAL THERAPIST

## 2021-01-14 PROCEDURE — 97110 THERAPEUTIC EXERCISES: CPT | Performed by: PHYSICAL THERAPIST

## 2021-01-14 PROCEDURE — 97112 NEUROMUSCULAR REEDUCATION: CPT | Performed by: PHYSICAL THERAPIST

## 2021-01-14 NOTE — PROGRESS NOTES
Physical Therapy Daily Progress Note    VISIT#: 22/40 in POC.   Date of surgery: 8/27/2020 (Massive  (R) RCR - supraspinatus, infraspinatus, subscapularis by Dr. Plaza) .   As of 1/7/21-20 weeks post.   Precautions: Follow protocol, h/o breast CA within last year (no e-stim)  scap strength only.   Dr. Plaza 7/21    Subjective   Tonja Ledbetter reports:  Pain has been worse over the last few days. Currently, 7/10 R shoulder, breast, ribs. She will see oncologist tomorrow re:  Breast pain.   Objective        See Exercise, Manual, and Modality Logs for complete treatment.       Patient Education:       Assessment/Plan: Pt. Seems to be experiencing some delayed muscle soreness.  Recommended icing after activities.       Progress per Plan of Care:             Timed:         Manual Therapy:     15    mins  64810;     Therapeutic Exercise:     20    mins  09264;     Neuromuscular Indio:  10      mins  41605;    Therapeutic Activity:          mins  47364;     Gait Training:           mins  42747;     Ultrasound:          mins  69841;    Ionto                                   mins   03197  Self Care                            mins   31399  Canalith Repos                   mins  4209  Aquatic                               mins 78493    Un-Timed:  Electrical Stimulation:         mins  83512 ( );  Dry Needling          mins self-pay  Traction          mins 93467  Low Eval          Mins  95694  Mod Eval          Mins  09653  High Eval                            Mins  98579  Re-Eval                               mins  19869    Timed Treatment:   45   mins   Total Treatment:     45   mins    Roselyn Dotson PTA

## 2021-01-25 ENCOUNTER — TREATMENT (OUTPATIENT)
Dept: PHYSICAL THERAPY | Facility: CLINIC | Age: 56
End: 2021-01-25

## 2021-01-25 DIAGNOSIS — Z98.890 S/P RIGHT ROTATOR CUFF REPAIR: Primary | ICD-10-CM

## 2021-01-25 DIAGNOSIS — S46.001A INJURY OF RIGHT ROTATOR CUFF, INITIAL ENCOUNTER: ICD-10-CM

## 2021-01-25 DIAGNOSIS — S46.001A ROTATOR CUFF INJURY, RIGHT, INITIAL ENCOUNTER: ICD-10-CM

## 2021-01-25 DIAGNOSIS — M75.111 NONTRAUMATIC INCOMPLETE TEAR OF RIGHT ROTATOR CUFF: ICD-10-CM

## 2021-01-25 PROCEDURE — 97140 MANUAL THERAPY 1/> REGIONS: CPT | Performed by: PHYSICAL THERAPIST

## 2021-01-25 PROCEDURE — 97110 THERAPEUTIC EXERCISES: CPT | Performed by: PHYSICAL THERAPIST

## 2021-01-25 NOTE — PROGRESS NOTES
Physical Therapy Daily Progress Note    VISIT#: 24    Subjective   Tonja Ledbetter reports she is relieved as her mammogram/MRI/US came back without any malignanfy.  During the MRI she had to hold her arms up overhead for 20 min and this was very painful for her.  She is still sore from that.     Pain Rating (0/10): 5    Objective     See Exercise, Manual, and Modality Logs for complete treatment.     Assessment/Plan  Pt sore today so went light on therex.  She is still having significant difficulty with shoulder ER. Clunking noted with PROM.    Plan  Progress per Plan of Care            Timed:         Manual Therapy:    15    mins  15482;     Therapeutic Exercise:    15     mins  01160;     Neuromuscular Indio:        mins  70214;    Therapeutic Activity:          mins  87977;     Gait Training:           mins  84692;     Ultrasound:          mins  25491;    Ionto                                   mins   03168  Self Care                            mins   31119    Un-Timed:  Electrical Stimulation:         mins  63348 ( );  Dry Needling          mins self-pay  Traction          mins 63232  Low Eval          Mins  24553  Mod Eval          Mins  32368  High Eval                            Mins  73573  Re-Eval                               mins  68161    Timed Treatment:   30   mins   Total Treatment:     30   mins    Katlin Turner, PT, DPT  Physical Therapist

## 2021-01-28 ENCOUNTER — TREATMENT (OUTPATIENT)
Dept: PHYSICAL THERAPY | Facility: CLINIC | Age: 56
End: 2021-01-28

## 2021-01-28 DIAGNOSIS — Z98.890 S/P RIGHT ROTATOR CUFF REPAIR: Primary | ICD-10-CM

## 2021-01-28 PROCEDURE — 97140 MANUAL THERAPY 1/> REGIONS: CPT | Performed by: PHYSICAL THERAPIST

## 2021-01-28 PROCEDURE — 97112 NEUROMUSCULAR REEDUCATION: CPT | Performed by: PHYSICAL THERAPIST

## 2021-01-28 PROCEDURE — 97110 THERAPEUTIC EXERCISES: CPT | Performed by: PHYSICAL THERAPIST

## 2021-01-28 NOTE — PROGRESS NOTES
Physical Therapy Daily Progress Note    VISIT#: 25/40 in POC.   Date of surgery: 8/27/2020 (Massive  (R) RCR - supraspinatus, infraspinatus, subscapularis by Dr. Plaza) .   As of 1/7/21-20 weeks post.   Precautions: Follow protocol, h/o breast CA within last year (no e-stim)  scap strength only.   Dr. Plaza 7/21    Subjective   Tonja Anatoly reports:  Pain has been better  Since not attending PT in 1 week.  Pain 2/10, R upper extremity musculature  Objective        See Exercise, Manual, and Modality Logs for complete treatment.   Progression as noted.     Patient Education:       Assessment/Plan: Crepitus noted with AROM abduction above 45 degrees and scaption above 70, no pain.      Progress per Plan of Care:             Timed:         Manual Therapy:     15    mins  27377;     Therapeutic Exercise:     20    mins  07607;     Neuromuscular Indio:  10      mins  09782;    Therapeutic Activity:          mins  48657;     Gait Training:           mins  87033;     Ultrasound:          mins  60938;    Ionto                                   mins   24750  Self Care                            mins   89213  Canalith Repos                   mins  4209  Aquatic                               mins 80778    Un-Timed:  Electrical Stimulation:         mins  77479 ( );  Dry Needling          mins self-pay  Traction          mins 99227  Low Eval          Mins  08946  Mod Eval          Mins  08375  High Eval                            Mins  94372  Re-Eval                               mins  70154    Timed Treatment:   45   mins   Total Treatment:     45   mins    Roselyn Dotson PTA

## 2021-02-01 ENCOUNTER — TREATMENT (OUTPATIENT)
Dept: PHYSICAL THERAPY | Facility: CLINIC | Age: 56
End: 2021-02-01

## 2021-02-01 DIAGNOSIS — Z98.890 S/P RIGHT ROTATOR CUFF REPAIR: Primary | ICD-10-CM

## 2021-02-01 PROCEDURE — 97110 THERAPEUTIC EXERCISES: CPT | Performed by: PHYSICAL THERAPIST

## 2021-02-01 PROCEDURE — 97140 MANUAL THERAPY 1/> REGIONS: CPT | Performed by: PHYSICAL THERAPIST

## 2021-02-01 PROCEDURE — 97112 NEUROMUSCULAR REEDUCATION: CPT | Performed by: PHYSICAL THERAPIST

## 2021-02-01 NOTE — PROGRESS NOTES
"Physical Therapy Daily Progress Note    VISIT#: 26/40 in POC.   Date of surgery: 8/27/2020 (Massive  (R) RCR - supraspinatus, infraspinatus, subscapularis by Dr. Plaza) .   As of 1/28/21-23 weeks post.   Precautions: Follow protocol, h/o breast CA within last year (no e-stim)  scap strength only.   Dr. Plaza 7/21    Subjective   Tonja Ledbetter reports:  No pain currently.  She is noticing less \"popping\" and \"crackling\"  Less.   Objective        See Exercise, Manual, and Modality Logs for complete treatment.   Progression as noted.     Patient Education:       Assessment/Plan: tactile and verbal cueing to avoid UT compensation.       Progress per Plan of Care:             Timed:         Manual Therapy:     15    mins  13292;     Therapeutic Exercise:     20    mins  78492;     Neuromuscular Indio:  10      mins  31066;    Therapeutic Activity:          mins  10557;     Gait Training:           mins  45362;     Ultrasound:          mins  84809;    Ionto                                   mins   89022  Self Care                            mins   21248  Canalith Repos                   mins  4209  Aquatic                               mins 70418    Un-Timed:  Electrical Stimulation:         mins  98535 ( );  Dry Needling          mins self-pay  Traction          mins 13432  Low Eval          Mins  16230  Mod Eval          Mins  52025  High Eval                            Mins  37748  Re-Eval                               mins  29801    Timed Treatment:   45   mins   Total Treatment:     45   mins    Roselyn Dotson PTA  "

## 2021-02-04 ENCOUNTER — TREATMENT (OUTPATIENT)
Dept: PHYSICAL THERAPY | Facility: CLINIC | Age: 56
End: 2021-02-04

## 2021-02-04 DIAGNOSIS — S46.001A ROTATOR CUFF INJURY, RIGHT, INITIAL ENCOUNTER: ICD-10-CM

## 2021-02-04 DIAGNOSIS — S46.001A INJURY OF RIGHT ROTATOR CUFF, INITIAL ENCOUNTER: ICD-10-CM

## 2021-02-04 DIAGNOSIS — M75.111 NONTRAUMATIC INCOMPLETE TEAR OF RIGHT ROTATOR CUFF: ICD-10-CM

## 2021-02-04 DIAGNOSIS — Z98.890 S/P RIGHT ROTATOR CUFF REPAIR: Primary | ICD-10-CM

## 2021-02-04 PROCEDURE — 97530 THERAPEUTIC ACTIVITIES: CPT | Performed by: PHYSICAL THERAPIST

## 2021-02-04 PROCEDURE — 97110 THERAPEUTIC EXERCISES: CPT | Performed by: PHYSICAL THERAPIST

## 2021-02-04 PROCEDURE — 97140 MANUAL THERAPY 1/> REGIONS: CPT | Performed by: PHYSICAL THERAPIST

## 2021-02-08 ENCOUNTER — TREATMENT (OUTPATIENT)
Dept: PHYSICAL THERAPY | Facility: CLINIC | Age: 56
End: 2021-02-08

## 2021-02-08 DIAGNOSIS — Z98.890 S/P RIGHT ROTATOR CUFF REPAIR: Primary | ICD-10-CM

## 2021-02-08 PROCEDURE — 97112 NEUROMUSCULAR REEDUCATION: CPT | Performed by: PHYSICAL THERAPIST

## 2021-02-08 PROCEDURE — 97140 MANUAL THERAPY 1/> REGIONS: CPT | Performed by: PHYSICAL THERAPIST

## 2021-02-08 PROCEDURE — 97110 THERAPEUTIC EXERCISES: CPT | Performed by: PHYSICAL THERAPIST

## 2021-02-08 NOTE — PROGRESS NOTES
Physical Therapy Daily Progress Note    VISIT#: 28/40 in POC.   Date of surgery: 8/27/2020 (Massive  (R) RCR - supraspinatus, infraspinatus, subscapularis by Dr. Plaza) .   As of 1/28/21-23 weeks post.   Precautions: Follow protocol, h/o breast CA within last year (no e-stim)  scap strength only.   Dr. Plaza 7/21    DATE 8/27/2020 9/3/2020 9/10/2020 9/17/2020 9/24/2020 10/1/2020 10/8/2020 10/15/2020 10/22/2020 10/29/2020   POST OP WEEK 0 1 2 3 4 5 6 7 8 9   DATE 11/5/2020 11/12/2020 11/19/2020 11/26/2020 12/3/2020 12/10/2020 12/17/2020 12/24/2020 12/31/2020 1/7/2021   POST OP WEEK 10 11 12 13 14 15 16 17 18 19   DATE 1/14/2021 1/21/2021 1/28/2021 2/4/2021 2/11/2021 2/18/2021 2/25/2021 3/4/2021 3/11/2021 3/18/2021   POST OP WEEK 20 21 22 23 24 25 26 27 28 29                      Subjective   Tonja Ledbetter reports:  She babysat for a 4 year old over weekend and had to lift him.  R arm is very sore as a result, 5/10  Objective        See Exercise, Manual, and Modality Logs for complete treatment.       Patient Education:       Assessment/Plan: IR, abd, and flexion remain limited and painful at end ranges.       Progress per Plan of Care:             Timed:         Manual Therapy:     15    mins  05322;     Therapeutic Exercise:     20    mins  62890;     Neuromuscular Indio:  10      mins  46054;    Therapeutic Activity:          mins  59146;     Gait Training:           mins  11732;     Ultrasound:          mins  82519;    Ionto                                   mins   84901  Self Care                            mins   71619  Canalith Repos                   mins  4209  Aquatic                               mins 56905    Un-Timed:  Electrical Stimulation:         mins  82036 ( );  Dry Needling          mins self-pay  Traction          mins 53423  Low Eval          Mins  70222  Mod Eval          Mins  53008  High Eval                            Mins  51382  Re-Eval                               mins   63097    Timed Treatment:   45   mins   Total Treatment:     45   mins    Roselyn Dotson, PTA

## 2021-02-15 ENCOUNTER — TREATMENT (OUTPATIENT)
Dept: PHYSICAL THERAPY | Facility: CLINIC | Age: 56
End: 2021-02-15

## 2021-02-15 DIAGNOSIS — Z98.890 S/P RIGHT ROTATOR CUFF REPAIR: Primary | ICD-10-CM

## 2021-02-15 PROCEDURE — 97140 MANUAL THERAPY 1/> REGIONS: CPT | Performed by: PHYSICAL THERAPIST

## 2021-02-15 PROCEDURE — 97112 NEUROMUSCULAR REEDUCATION: CPT | Performed by: PHYSICAL THERAPIST

## 2021-02-15 PROCEDURE — 97110 THERAPEUTIC EXERCISES: CPT | Performed by: PHYSICAL THERAPIST

## 2021-02-15 NOTE — PROGRESS NOTES
"Physical Therapy Daily Progress Note    VISIT#: 28/40 in POC.   Date of surgery: 8/27/2020 (Massive  (R) RCR - supraspinatus, infraspinatus, subscapularis by Dr. Plaza) .     Precautions: Follow protocol, h/o breast CA within last year (no e-stim)  scap strength only.   Dr. Plaza 7/21    DATE 8/27/2020 9/3/2020 9/10/2020 9/17/2020 9/24/2020 10/1/2020 10/8/2020 10/15/2020 10/22/2020 10/29/2020   POST OP WEEK 0 1 2 3 4 5 6 7 8 9   DATE 11/5/2020 11/12/2020 11/19/2020 11/26/2020 12/3/2020 12/10/2020 12/17/2020 12/24/2020 12/31/2020 1/7/2021   POST OP WEEK 10 11 12 13 14 15 16 17 18 19   DATE 1/14/2021 1/21/2021 1/28/2021 2/4/2021 2/11/2021 2/18/2021 2/25/2021 3/4/2021 3/11/2021 3/18/2021   POST OP WEEK 20 21 22 23 24 25 26 27 28 29                      Subjective   Tojna Ledbetter reports:  Reached up into cupboard without thinking, making R shoulder sore.  Today, it is still sore 5/10 L shoulder.   Objective        See Exercise, Manual, and Modality Logs for complete treatment.       Patient Education: Patient educated regarding impingement and ways to prevent \"pinching\" sensation    Assessment/Plan: Pt. Had more difficulty with there ex today due to being so sore.  Crepitus continues, no pain with it.       Progress per Plan of Care:             Timed:         Manual Therapy:     15    mins  43118;     Therapeutic Exercise:     20    mins  81754;     Neuromuscular Indio:  10      mins  28310;    Therapeutic Activity:          mins  75178;     Gait Training:           mins  65013;     Ultrasound:          mins  26970;    Ionto                                   mins   79972  Self Care                            mins   66172  Canalith Repos                   mins  4209  Aquatic                               mins 97675    Un-Timed:  Electrical Stimulation:         mins  49012 ( );  Dry Needling          mins self-pay  Traction          mins 87808  Low Eval          Mins  22747  Mod Eval          Mins  15840  High " Eval                            Mins  66784  Re-Eval                               mins  78944    Timed Treatment:   45   mins   Total Treatment:     45   mins    Roselyn Dotson, PTA

## 2021-02-17 ENCOUNTER — TREATMENT (OUTPATIENT)
Dept: PHYSICAL THERAPY | Facility: CLINIC | Age: 56
End: 2021-02-17

## 2021-02-17 DIAGNOSIS — Z98.890 S/P RIGHT ROTATOR CUFF REPAIR: Primary | ICD-10-CM

## 2021-02-17 PROCEDURE — 97112 NEUROMUSCULAR REEDUCATION: CPT | Performed by: PHYSICAL THERAPIST

## 2021-02-17 PROCEDURE — 97110 THERAPEUTIC EXERCISES: CPT | Performed by: PHYSICAL THERAPIST

## 2021-02-17 PROCEDURE — 97140 MANUAL THERAPY 1/> REGIONS: CPT | Performed by: PHYSICAL THERAPIST

## 2021-02-17 NOTE — PROGRESS NOTES
Physical Therapy Daily Progress Note    VISIT#: 28/40 in POC.   Date of surgery: 8/27/2020 (Massive  (R) RCR - supraspinatus, infraspinatus, subscapularis by Dr. Plaza) .     Precautions: Follow protocol, h/o breast CA within last year (no e-stim)  scap strength only.   Dr. Plaza 7/21    DATE 8/27/2020 9/3/2020 9/10/2020 9/17/2020 9/24/2020 10/1/2020 10/8/2020 10/15/2020 10/22/2020 10/29/2020   POST OP WEEK 0 1 2 3 4 5 6 7 8 9   DATE 11/5/2020 11/12/2020 11/19/2020 11/26/2020 12/3/2020 12/10/2020 12/17/2020 12/24/2020 12/31/2020 1/7/2021   POST OP WEEK 10 11 12 13 14 15 16 17 18 19   DATE 1/14/2021 1/21/2021 1/28/2021 2/4/2021 2/11/2021 2/18/2021 2/25/2021 3/4/2021 3/11/2021 3/18/2021   POST OP WEEK 20 21 22 23 24 25 26 27 28 29                      Subjective   Tonja Ledbetter reports:  R shoulder feeling pretty good today.   Objective        See Exercise, Manual, and Modality Logs for complete treatment.   Progression as noted.     Patient Education:     Assessment/Plan: Pt. Able to progress without issue.  R shoulder ER remains weak. Consistent cueing required to avoid shoulder hike with AROM.      Progress per Plan of Care:             Timed:         Manual Therapy:     15    mins  14100;     Therapeutic Exercise:     20    mins  82881;     Neuromuscular Indio:  10      mins  81845;    Therapeutic Activity:          mins  25817;     Gait Training:           mins  65987;     Ultrasound:          mins  90183;    Ionto                                   mins   18199  Self Care                            mins   95558  Canalith Repos                   mins  4209  Aquatic                               mins 68761    Un-Timed:  Electrical Stimulation:         mins  48929 ( );  Dry Needling          mins self-pay  Traction          mins 80424  Low Eval          Mins  61124  Mod Eval          Mins  06258  High Eval                            Mins  49526  Re-Eval                               mins  29007    Timed  Treatment:   45   mins   Total Treatment:     45   mins    Roselyn Dotson, PTA

## 2021-02-22 ENCOUNTER — TREATMENT (OUTPATIENT)
Dept: PHYSICAL THERAPY | Facility: CLINIC | Age: 56
End: 2021-02-22

## 2021-02-22 DIAGNOSIS — Z98.890 S/P RIGHT ROTATOR CUFF REPAIR: Primary | ICD-10-CM

## 2021-02-22 DIAGNOSIS — S46.001A ROTATOR CUFF INJURY, RIGHT, INITIAL ENCOUNTER: ICD-10-CM

## 2021-02-22 DIAGNOSIS — M75.111 NONTRAUMATIC INCOMPLETE TEAR OF RIGHT ROTATOR CUFF: ICD-10-CM

## 2021-02-22 DIAGNOSIS — S46.001A INJURY OF RIGHT ROTATOR CUFF, INITIAL ENCOUNTER: ICD-10-CM

## 2021-02-22 PROCEDURE — 97110 THERAPEUTIC EXERCISES: CPT | Performed by: PHYSICAL THERAPIST

## 2021-02-22 PROCEDURE — 97140 MANUAL THERAPY 1/> REGIONS: CPT | Performed by: PHYSICAL THERAPIST

## 2021-02-22 NOTE — PROGRESS NOTES
Physical Therapy Daily Progress Note    VISIT#: 31    Subjective   Tonja Ledbetter reports she is sore today as she was holding her arm up a lot to hang birthday decorations.  Iced a lot yesterday.     Pain Rating (0/10): 5    Objective     See Exercise, Manual, and Modality Logs for complete treatment.     Assessment/Plan  Patient more sore today.  Continued with some current strengthening, and added back in pect stretch. Pt with increased pect tenderness and soft tissue tightness to lateral border.     Plan  Progress per Plan of Care            Timed:         Manual Therapy:   17      mins  65823;     Therapeutic Exercise:    28     mins  89232;     Neuromuscular Indio:        mins  07413;    Therapeutic Activity:          mins  24807;     Gait Training:           mins  75784;     Ultrasound:          mins  59670;    Ionto                                   mins   22909  Self Care                            mins   37821    Un-Timed:  Electrical Stimulation:         mins  13343 ( );  Dry Needling          mins self-pay  Traction          mins 89562  Low Eval          Mins  85725  Mod Eval          Mins  78567  High Eval                            Mins  43889  Re-Eval                               mins  94493    Timed Treatment:   45   mins   Total Treatment:     45   mins    Katlin Turner, PT, DPT  Physical Therapist

## 2021-02-25 ENCOUNTER — TREATMENT (OUTPATIENT)
Dept: PHYSICAL THERAPY | Facility: CLINIC | Age: 56
End: 2021-02-25

## 2021-02-25 DIAGNOSIS — Z98.890 S/P RIGHT ROTATOR CUFF REPAIR: Primary | ICD-10-CM

## 2021-02-25 DIAGNOSIS — M75.111 NONTRAUMATIC INCOMPLETE TEAR OF RIGHT ROTATOR CUFF: ICD-10-CM

## 2021-02-25 DIAGNOSIS — S46.001A ROTATOR CUFF INJURY, RIGHT, INITIAL ENCOUNTER: ICD-10-CM

## 2021-02-25 DIAGNOSIS — S46.001A INJURY OF RIGHT ROTATOR CUFF, INITIAL ENCOUNTER: ICD-10-CM

## 2021-02-25 PROCEDURE — 97112 NEUROMUSCULAR REEDUCATION: CPT | Performed by: PHYSICAL THERAPIST

## 2021-02-25 PROCEDURE — 97140 MANUAL THERAPY 1/> REGIONS: CPT | Performed by: PHYSICAL THERAPIST

## 2021-02-25 PROCEDURE — 97110 THERAPEUTIC EXERCISES: CPT | Performed by: PHYSICAL THERAPIST

## 2021-02-25 NOTE — PROGRESS NOTES
Physical Therapy Daily Progress Note    VISIT#: 32    Subjective   Tonja Ledbetter reports she is feeling stronger and less sore today.     Pain Rating (0/10): 2    Objective     See Exercise, Manual, and Modality Logs for complete treatment.     Assessment/Plan  Patient with more tolerance to exercises today.  Demonstrated improved control and ability to perform red tband during RC exercises.     Plan  Progress per Plan of Care            Timed:         Manual Therapy:    10    mins  20749;     Therapeutic Exercise:   20     mins  26133;     Neuromuscular Indio:  10    mins  31186;    Therapeutic Activity:          mins  59541;     Gait Training:          mins  05649;     Ultrasound:          mins  90019;    Ionto                                   mins   44490  Self Care                            mins   92757    Un-Timed:  Electrical Stimulation:         mins  73434 ( );  Dry Needling          mins self-pay  Traction          mins 54277  Low Eval          Mins  24433  Mod Eval          Mins  49114  High Eval                            Mins  91516  Re-Eval                               mins  47923    Timed Treatment:   40   mins   Total Treatment:     40   mins    Katlin Turner, PT, DPT  Physical Therapist

## 2021-03-01 ENCOUNTER — TREATMENT (OUTPATIENT)
Dept: PHYSICAL THERAPY | Facility: CLINIC | Age: 56
End: 2021-03-01

## 2021-03-01 DIAGNOSIS — S46.001A ROTATOR CUFF INJURY, RIGHT, INITIAL ENCOUNTER: ICD-10-CM

## 2021-03-01 DIAGNOSIS — S46.001A INJURY OF RIGHT ROTATOR CUFF, INITIAL ENCOUNTER: ICD-10-CM

## 2021-03-01 DIAGNOSIS — Z98.890 S/P RIGHT ROTATOR CUFF REPAIR: Primary | ICD-10-CM

## 2021-03-01 DIAGNOSIS — M75.111 NONTRAUMATIC INCOMPLETE TEAR OF RIGHT ROTATOR CUFF: ICD-10-CM

## 2021-03-01 PROCEDURE — 97110 THERAPEUTIC EXERCISES: CPT | Performed by: PHYSICAL THERAPIST

## 2021-03-01 PROCEDURE — 97530 THERAPEUTIC ACTIVITIES: CPT | Performed by: PHYSICAL THERAPIST

## 2021-03-01 PROCEDURE — 97140 MANUAL THERAPY 1/> REGIONS: CPT | Performed by: PHYSICAL THERAPIST

## 2021-03-01 NOTE — PROGRESS NOTES
Physical Therapy Daily Progress Note    VISIT#: 33    Subjective   Tonja Ledbetter reports she is doing ok.  A little sore.     Pain Rating (0/10): 2    Objective     See Exercise, Manual, and Modality Logs for complete treatment.     Assessment/Plan  Patient fatigued end of session post ball on wall stability.  Doing better with ER strengthening.     Plan  Progress per Plan of Care            Timed:         Manual Therapy:   15     mins  87202;     Therapeutic Exercise:    20     mins  29776;     Neuromuscular Indio:        mins  10255;    Therapeutic Activity:     10     mins  67597;     Gait Training:           mins  40265;     Ultrasound:          mins  64588;    Ionto                                   mins   36460  Self Care                            mins   02346    Un-Timed:  Electrical Stimulation:         mins  33834 ( );  Dry Needling          mins self-pay  Traction          mins 31875  Low Eval          Mins  15330  Mod Eval          Mins  32870  High Eval                            Mins  29112  Re-Eval                               mins  98482    Timed Treatment:   45   mins   Total Treatment:     45   mins    Katlin Turner, PT, DPT  Physical Therapist

## 2021-03-08 ENCOUNTER — TREATMENT (OUTPATIENT)
Dept: PHYSICAL THERAPY | Facility: CLINIC | Age: 56
End: 2021-03-08

## 2021-03-08 DIAGNOSIS — S46.001A ROTATOR CUFF INJURY, RIGHT, INITIAL ENCOUNTER: ICD-10-CM

## 2021-03-08 DIAGNOSIS — Z98.890 S/P RIGHT ROTATOR CUFF REPAIR: Primary | ICD-10-CM

## 2021-03-08 DIAGNOSIS — S46.001A INJURY OF RIGHT ROTATOR CUFF, INITIAL ENCOUNTER: ICD-10-CM

## 2021-03-08 DIAGNOSIS — M75.111 NONTRAUMATIC INCOMPLETE TEAR OF RIGHT ROTATOR CUFF: ICD-10-CM

## 2021-03-08 PROCEDURE — 97530 THERAPEUTIC ACTIVITIES: CPT | Performed by: PHYSICAL THERAPIST

## 2021-03-08 PROCEDURE — 97140 MANUAL THERAPY 1/> REGIONS: CPT | Performed by: PHYSICAL THERAPIST

## 2021-03-08 PROCEDURE — 97110 THERAPEUTIC EXERCISES: CPT | Performed by: PHYSICAL THERAPIST

## 2021-03-08 NOTE — PROGRESS NOTES
Physical Therapy Daily Progress Note    VISIT#: 34    Subjective   Tonja Ledbetter reports she is doing ok. Did a lot of cooking this weekend for family and she was sore after.  Is continuing to notice pect region pain/discomfort (lateral border).  Feel weak still, but is using arm more.     Pain Rating (0/10): 3    Objective     See Exercise, Manual, and Modality Logs for complete treatment.     R shoulder AROM:   Flexion: 166  Extension: WFL  Abduction: 166  IE/ER: T11/74    Strength of R shoulder/elbow:   Flex:3+ to 4-/5  Ext: 4-/5  Abd:3+ to 4-/5  IR:4-/5  ER: 3+/5  Biceps:4-/5  Triceps: 4-/5    Assessment/Plan  Pt able to get further with horizontal abd in prone today.  Showing slight improvement in proximal shoulder stability; however, she continues to have joint popping/grinding during passive ROM.  Pain levels are continuing to improve, and she overall  Is showing improved functional use of RUE.     Plan  Progress per Plan of Care.             Timed:         Manual Therapy:    15     mins  40833;     Therapeutic Exercise:   20      mins  57162;     Neuromuscular Indio:        mins  24726;    Therapeutic Activity:     10     mins  77952;     Gait Training:           mins  14694;     Ultrasound:          mins  31154;    Ionto                                   mins   37295  Self Care                            mins   16771    Un-Timed:  Electrical Stimulation:         mins  26692 ( );  Dry Needling          mins self-pay  Traction          mins 94782  Low Eval          Mins  57865  Mod Eval          Mins  29646  High Eval                            Mins  62371  Re-Eval                               mins  34197    Timed Treatment:   45   mins   Total Treatment:     45   mins    Katlin Turner, PT, DPT  Physical Therapist

## 2021-03-11 ENCOUNTER — TREATMENT (OUTPATIENT)
Dept: PHYSICAL THERAPY | Facility: CLINIC | Age: 56
End: 2021-03-11

## 2021-03-11 DIAGNOSIS — S46.001A ROTATOR CUFF INJURY, RIGHT, INITIAL ENCOUNTER: ICD-10-CM

## 2021-03-11 DIAGNOSIS — M75.111 NONTRAUMATIC INCOMPLETE TEAR OF RIGHT ROTATOR CUFF: ICD-10-CM

## 2021-03-11 DIAGNOSIS — Z98.890 S/P RIGHT ROTATOR CUFF REPAIR: Primary | ICD-10-CM

## 2021-03-11 DIAGNOSIS — S46.001A INJURY OF RIGHT ROTATOR CUFF, INITIAL ENCOUNTER: ICD-10-CM

## 2021-03-11 PROCEDURE — 97140 MANUAL THERAPY 1/> REGIONS: CPT | Performed by: PHYSICAL THERAPIST

## 2021-03-11 PROCEDURE — 97530 THERAPEUTIC ACTIVITIES: CPT | Performed by: PHYSICAL THERAPIST

## 2021-03-11 PROCEDURE — 97110 THERAPEUTIC EXERCISES: CPT | Performed by: PHYSICAL THERAPIST

## 2021-03-11 NOTE — PROGRESS NOTES
Physical Therapy Daily Progress Note    VISIT#: 35    Subjective   Tonja Ledbetter reports she is doing ok.  Met with doctor and pleased with ROM except for abduction.  She will not be returning to doctor until August 2021.     Pain Rating (0/10): 2    Objective     See Exercise, Manual, and Modality Logs for complete treatment.     Assessment/Plan  Patient did well with re-introduction/initiation of additional strength exercises.  Fatigued end of session.     Plan  Progress per Plan of Care            Timed:         Manual Therapy:    15     mins  35800;     Therapeutic Exercise:   15      mins  63120;     Neuromuscular Indio:        mins  81563;    Therapeutic Activity:     15     mins  00556;     Gait Training:           mins  04043;     Ultrasound:          mins  61523;    Ionto                                   mins   05992  Self Care                            mins   51292    Un-Timed:  Electrical Stimulation:         mins  62027 ( );  Dry Needling          mins self-pay  Traction          mins 82348  Low Eval          Mins  41974  Mod Eval          Mins  84018  High Eval                            Mins  17027  Re-Eval                               mins  55324    Timed Treatment:   45   mins   Total Treatment:     45   mins    Katlin Turner, PT, DPT  Physical Therapist

## 2021-03-17 ENCOUNTER — TREATMENT (OUTPATIENT)
Dept: PHYSICAL THERAPY | Facility: CLINIC | Age: 56
End: 2021-03-17

## 2021-03-17 DIAGNOSIS — S46.001A INJURY OF RIGHT ROTATOR CUFF, INITIAL ENCOUNTER: ICD-10-CM

## 2021-03-17 DIAGNOSIS — Z98.890 S/P RIGHT ROTATOR CUFF REPAIR: Primary | ICD-10-CM

## 2021-03-17 DIAGNOSIS — S46.001A ROTATOR CUFF INJURY, RIGHT, INITIAL ENCOUNTER: ICD-10-CM

## 2021-03-17 DIAGNOSIS — M75.111 NONTRAUMATIC INCOMPLETE TEAR OF RIGHT ROTATOR CUFF: ICD-10-CM

## 2021-03-17 PROCEDURE — 97140 MANUAL THERAPY 1/> REGIONS: CPT | Performed by: PHYSICAL THERAPIST

## 2021-03-17 PROCEDURE — 97530 THERAPEUTIC ACTIVITIES: CPT | Performed by: PHYSICAL THERAPIST

## 2021-03-17 PROCEDURE — 97110 THERAPEUTIC EXERCISES: CPT | Performed by: PHYSICAL THERAPIST

## 2021-03-17 NOTE — PROGRESS NOTES
Physical Therapy Daily Progress Note    VISIT#: 36    Subjective   Tonja Ledbetter reports she is doing ok.  A little sore, and is able to do her hair better with the curling iron.      Pain Rating (0/10): 2    Objective     See Exercise, Manual, and Modality Logs for complete treatment.     Assessment/Plan  Patient fatigued end of session with additional strengthening today. Abduction most difficult.     Plan  Progress per Plan of Care            Timed:         Manual Therapy:    15     mins  41582;     Therapeutic Exercise:    15     mins  07680;     Neuromuscular Indio:        mins  56414;    Therapeutic Activity:     15     mins  59062;     Gait Training:        mins  47578;     Ultrasound:          mins  66536;    Ionto                                   mins   58815  Self Care                            mins   51609    Un-Timed:  Electrical Stimulation:         mins  08426 ( );  Dry Needling          mins self-pay  Traction          mins 18873  Low Eval          Mins  95297  Mod Eval          Mins  59313  High Eval                            Mins  33947  Re-Eval                               mins  96001    Timed Treatment:   45   mins   Total Treatment:     45   mins    Katlin Turner, PT, DPT  Physical Therapist

## 2021-03-22 ENCOUNTER — TREATMENT (OUTPATIENT)
Dept: PHYSICAL THERAPY | Facility: CLINIC | Age: 56
End: 2021-03-22

## 2021-03-22 DIAGNOSIS — Z98.890 S/P RIGHT ROTATOR CUFF REPAIR: Primary | ICD-10-CM

## 2021-03-22 DIAGNOSIS — S46.001A ROTATOR CUFF INJURY, RIGHT, INITIAL ENCOUNTER: ICD-10-CM

## 2021-03-22 DIAGNOSIS — M75.111 NONTRAUMATIC INCOMPLETE TEAR OF RIGHT ROTATOR CUFF: ICD-10-CM

## 2021-03-22 DIAGNOSIS — S46.001A INJURY OF RIGHT ROTATOR CUFF, INITIAL ENCOUNTER: ICD-10-CM

## 2021-03-22 PROCEDURE — 97140 MANUAL THERAPY 1/> REGIONS: CPT | Performed by: PHYSICAL THERAPIST

## 2021-03-22 PROCEDURE — 97110 THERAPEUTIC EXERCISES: CPT | Performed by: PHYSICAL THERAPIST

## 2021-03-22 PROCEDURE — 97530 THERAPEUTIC ACTIVITIES: CPT | Performed by: PHYSICAL THERAPIST

## 2021-03-22 NOTE — PROGRESS NOTES
Physical Therapy Daily Progress Note    VISIT#: 37    Subjective   Tonja Ledbetter reports she was really sore and in pain after last session.  She hurt for a few days after due to the standing therex.     Pain Rating (0/10): 0    Objective     See Exercise, Manual, and Modality Logs for complete treatment.     Assessment/Plan  Patient with no significant pain post session.  Fatigued post standing therex.     Plan  Progress per Plan of Care.              Timed:         Manual Therapy:    15     mins  44226;     Therapeutic Exercise:    15     mins  55008;     Neuromuscular Indio:       mins  06896;    Therapeutic Activity:     15     mins  40460;     Gait Training:           mins  10844;     Ultrasound:          mins  91463;    Ionto                                   mins   88342  Self Care                            mins   81576    Un-Timed:  Electrical Stimulation:         mins  41404 ( );  Dry Needling          mins self-pay  Traction          mins 83300  Low Eval          Mins  03760  Mod Eval          Mins  47440  High Eval                            Mins  06960  Re-Eval                               mins  88124    Timed Treatment:   45   mins   Total Treatment:     45   mins    Katlin Turner, PT, DPT  Physical Therapist

## 2021-06-07 ENCOUNTER — DOCUMENTATION (OUTPATIENT)
Dept: PHYSICAL THERAPY | Facility: CLINIC | Age: 56
End: 2021-06-07

## 2021-06-08 ENCOUNTER — TREATMENT (OUTPATIENT)
Dept: PHYSICAL THERAPY | Facility: CLINIC | Age: 56
End: 2021-06-08

## 2021-06-08 DIAGNOSIS — Z98.890 S/P RIGHT ROTATOR CUFF REPAIR: Primary | ICD-10-CM

## 2021-06-08 DIAGNOSIS — M54.2 PAIN, NECK: ICD-10-CM

## 2021-06-08 DIAGNOSIS — M54.6 ACUTE THORACIC BACK PAIN, UNSPECIFIED BACK PAIN LATERALITY: ICD-10-CM

## 2021-06-08 PROCEDURE — 97140 MANUAL THERAPY 1/> REGIONS: CPT | Performed by: PHYSICAL THERAPIST

## 2021-06-08 PROCEDURE — 97161 PT EVAL LOW COMPLEX 20 MIN: CPT | Performed by: PHYSICAL THERAPIST

## 2021-06-08 NOTE — PROGRESS NOTES
Physical Therapy Initial Evaluation and Plan of Care     Patient: Tonja Ledbetter   : 1965  Diagnosis/ICD-10 Code:  S/P right rotator cuff repair [Z98.890], Pain, neck [M54.2], Acute thoracic back pain, unspecified back pain laterality [M54.6]  Referring practitioner: Self referral   Date of Initial Visit: 10/12/2020  Today's Date: 10/12/2020  Patient seen for 1 sessions               Subjective Questionnaire: QuickDASH: 32% impairment        Subjective Evaluation     History of Present Illness  Mechanism of injury: 2020 massive (R) RCR - supraspinatus, infraspinatus, subscapularis by Dr. Plaza     Pt is returning to PT as she has had a flare-up within the last couple weeks.  She was helping family/friends do a lot of moving where she was raising her arms up/down and lifting frequently.  She reports that the pain worsens as the day goes on.  She almost dreads as the day goes on at this point.  Ok in the mornings.  Has had scans/imaging show no new CA growth and is still considered cancer free.  Has pain that wraps around rib cage, is between shoulder blades into L shoulder/arm.  Gets some tingling at times in her arm.  Has h/o neck issues.  Concerned she may have done something wrong to her shoulder as she was doing really well for a couple weeks.    PMHx: breast CA - in remission, radiation ended in May 2020    Patient occupation: Unemployed - homemaker     Pain  Current pain ratin  At best pain ratin  At worst pain ratin  Quality: dull ache and tight  Relieving factors: medications and ice  Aggravating factors: outstretched reach, repetitive movement, lifting, movement and overhead activity  Progression: improved     Social Support  Lives with: spouse     Treatments  No previous or current treatments  Patient Goals  Patient goals for therapy: decreased pain, increased motion, increased strength, independence with ADLs/IADLs         Objective     Active Range of Motion   Left Shoulder    Normal active range of motion  Flexion: 180 degrees   Abduction: 180 degrees   External rotation BTH: T1   Internal rotation BTB: L2     Passive Range of Motion      Right Shoulder   Flexion: 151  External rotation 0°: 61 degrees   Internal rotation 0°: T10  Abduction: 167    Additional Passive Range of Motion Details  scaption 90 deg - slight pain at end range       Assessment & Plan      Assessment  Impairments: abnormal or restricted ROM, activity intolerance, impaired physical strength, lacks appropriate home exercise program and pain with function  Assessment details:   Prognosis: good  Functional Limitations: carrying objects, lifting, sleeping, pulling, pushing, uncomfortable because of pain, reaching behind back, reaching overhead and unable to perform repetitive tasks    Goals    SHORT TERM GOALS: Time for Goal Achievement: 2 weeks  1.  Patient to be compliant with and understand progression of HEP.   2.  Increase cervical, thoracic and (R) GH mobility to allow for improved mobility of shoulder with less pain  3. Pt to report pain at worst to 4/10 in order to resume ADLs and light lifting as needed.    LONG TERM GOALS: Time for Goal Achievement: 4 weeks  1.  Pt score < 25% perceived disability on Quick DASH.  2.  Pt. to exhibit (R) shoulder AROM to WFL to allow for reaching overhead and out (ABD) without pain limiting function.  3.  Pt to exhibit 4+ to 5/5 UE strength to allow for pushing /pulling and lifting to occur with pain < 3/10  4.  Pt able to reach overhead and lift 20# to allow for return to doing home/yard/recreational activities with min to no pain.      Plan  Therapy options: will be seen for skilled physical therapy services  Planned modality interventions: cryotherapy, thermotherapy (hydrocollator packs) and dry needling (no e-stim as pt recently got over CA).   Planned therapy interventions: manual therapy, neuromuscular re-education, soft tissue mobilization, spinal/joint mobilization,  strengthening, stretching, therapeutic activities, joint mobilization, home exercise program, functional ROM exercises, flexibility and body mechanics training  Frequency: 1-2x per week  Duration in visits: 8  Treatment plan discussed with: patient         History # of Personal Factors and/or Comorbidities: MODERATE (1-2)  Examination of Body System(s): # of elements: LOW (1-2)  Clinical Presentation: STABLE   Clinical Decision Making: LOW     Timed:                                                                           Manual Therapy:            10     mins  98563;                  Therapeutic Exercise:         mins  78253;     Neuromuscular Indio:        mins  31331;    Therapeutic Activity:           mins  29908;     Gait Training:                      mins  73597;     Ultrasound:                          mins  28931;    Ionto                                   mins   62174  Self Care                       10     mins   72691  Canalith Repos                   mins 43814        Un-Timed:  Electrical Stimulation:         mins  91228 ( );  Traction                               mins 40672  Dry Needle                 ______ mins DRYNDL  Low Eval                        20     Mins  58072  Mod Eval                             Mins  91378  High Eval                            Mins  81264  Re-Eval                               mins  86142           Timed Treatment:   20   mins   Total Treatment:     40   mins     PT SIGNATURE: Katlin Turner PT, DPT            DATE TREATMENT INITIATED: 06/08/2021     Initial Certification                Certification Period: 1/10/2021  I certify that the therapy services are furnished while this patient is under my care.  The services outlined above are required by this patient, and will be reviewed every 90 days.                PHYSICIAN:                                  DATE:      Please sign and return via fax to [phone].. Thank you, [facility].

## 2021-06-10 NOTE — PROGRESS NOTES
Discharge Summary  Discharge Summary from Physical Therapy Report      Date of Initial PT visit: 10/12/2020  Number of Visits Seen:  37    Discharge Status of Patient: Patient doing well per subjective reports, and no longer needs PT.  DoinG HEP at home.     Goals: Partially Met  Plan Goals: STG: to be met within 6 visits   1. Pt to show (I) with initial HEP. - met  2. Pt to show full PROM of (R) shoulder with minimal increase in pain. - not met, progressing   3. Pt to show minimal issues with AAROM in shoulder. - not met, progressing     LTG: to be met by DC   1. Pt to be (I) with finalized HEP. - progressing   2. Pt to report decreased impairment per QuickDASH to less than 15% impairment. - not met   3. Pt to show full functional strength in (R) shoulder with minimal increase in pain. - not met   4. Pt to return to all normal ADLs and recreational activities with minimal increase in pain. - progressing w/ADLs, not met recreational activities   5. No issues with sleeping d/t (R) shoulder pain. - not met, progressing                   Discharge Plan: Continue with current home exercise program as instructed    Date of Discharge: 06/07/2021    Katlin Turner, PT, DPT  Physical Therapist

## 2021-08-03 ENCOUNTER — DOCUMENTATION (OUTPATIENT)
Dept: PHYSICAL THERAPY | Facility: CLINIC | Age: 56
End: 2021-08-03

## 2021-08-03 NOTE — PROGRESS NOTES
Discharge Summary  Discharge Summary from Physical Therapy Report      Date of Initial PT visit:06/08/21  Number of Visits Seen: 1    Discharge Status of Patient: Pt did not return after this eval.  Continuing with HEP.     Goals: Not Met    Discharge Plan: Continue with current home exercise program as instructed  Patient to return to referring/providing physician      Date of Discharge: 08/03/2021      Katlin Turner, PT, DPT  Physical Therapist

## 2022-01-06 ENCOUNTER — TRANSCRIBE ORDERS (OUTPATIENT)
Dept: ADMINISTRATIVE | Facility: HOSPITAL | Age: 57
End: 2022-01-06

## 2022-01-06 ENCOUNTER — LAB (OUTPATIENT)
Dept: LAB | Facility: HOSPITAL | Age: 57
End: 2022-01-06

## 2022-01-06 DIAGNOSIS — Z11.52 ENCOUNTER FOR SCREENING FOR SEVERE ACUTE RESPIRATORY SYNDROME CORONAVIRUS 2 (SARS-COV-2) INFECTION: Primary | ICD-10-CM

## 2022-01-06 DIAGNOSIS — Z11.52 ENCOUNTER FOR SCREENING FOR SEVERE ACUTE RESPIRATORY SYNDROME CORONAVIRUS 2 (SARS-COV-2) INFECTION: ICD-10-CM

## 2022-01-06 LAB — SARS-COV-2 ORF1AB RESP QL NAA+PROBE: NOT DETECTED

## 2022-01-06 PROCEDURE — C9803 HOPD COVID-19 SPEC COLLECT: HCPCS

## 2022-01-06 PROCEDURE — U0004 COV-19 TEST NON-CDC HGH THRU: HCPCS

## 2022-06-14 ENCOUNTER — HOSPITAL ENCOUNTER (EMERGENCY)
Facility: HOSPITAL | Age: 57
Discharge: LEFT BEFORE DISCHARGE | End: 2022-06-15

## 2022-06-14 VITALS
DIASTOLIC BLOOD PRESSURE: 86 MMHG | HEART RATE: 81 BPM | SYSTOLIC BLOOD PRESSURE: 133 MMHG | BODY MASS INDEX: 21.49 KG/M2 | RESPIRATION RATE: 16 BRPM | HEIGHT: 65 IN | WEIGHT: 129 LBS | OXYGEN SATURATION: 97 % | TEMPERATURE: 97.9 F

## 2022-06-14 PROCEDURE — 99211 OFF/OP EST MAY X REQ PHY/QHP: CPT

## 2022-08-30 ENCOUNTER — TREATMENT (OUTPATIENT)
Dept: PHYSICAL THERAPY | Facility: CLINIC | Age: 57
End: 2022-08-30

## 2022-08-30 DIAGNOSIS — Z98.890 S/P LEFT ROTATOR CUFF REPAIR: Primary | ICD-10-CM

## 2022-08-30 DIAGNOSIS — M54.2 PAIN, NECK: ICD-10-CM

## 2022-08-30 PROCEDURE — 97110 THERAPEUTIC EXERCISES: CPT | Performed by: PHYSICAL THERAPIST

## 2022-08-30 PROCEDURE — 97140 MANUAL THERAPY 1/> REGIONS: CPT | Performed by: PHYSICAL THERAPIST

## 2022-08-30 PROCEDURE — 97161 PT EVAL LOW COMPLEX 20 MIN: CPT | Performed by: PHYSICAL THERAPIST

## 2022-08-30 NOTE — PROGRESS NOTES
Physical Therapy Initial Evaluation and Plan of Care    Patient: Tonja Ledbetter   : 1965  Diagnosis/ICD-10 Code:  S/P left rotator cuff repair [Z98.890]  Referring practitioner: Jeferson Plaza MD   Outcome Measures: Silvio DASH: ADL: 27.27% disability    Subjective Evaluation    History of Present Illness  Mechanism of injury: Pt reports to therapy with s/p L RTC repair on  following a fall on . Pt reports that she has some soreness today following a wedding reception on Saturday where her arm was rest on a chair. Pt has a hx of R RTC repair and it is doing well but is sore due to increase use. No numbness or tingling noted.       Aggravating factors: elevation, activity    Alleviating factors: ice, ibuprofen      PMHx:cancer    Quality of life: good    Pain  Current pain ratin  At best pain ratin  At worst pain ratin    Hand dominance: right    Patient Goals  Patient goals for therapy: increased motion, decreased pain, increased strength, independence with ADLs/IADLs, return to sport/leisure activities, improved balance and decreased edema             Objective          Postural Observations  Seated posture: good  Standing posture: good        Neurological Testing     Sensation     Shoulder   Left Shoulder   Intact: light touch    Right Shoulder   Intact: light touch    Active Range of Motion     Right Shoulder   Flexion: 161 degrees   Extension: 60 degrees   Abduction: 170 degrees   External rotation 0°: 75 degrees   Internal rotation BTB: T11     Additional Active Range of Motion Details  LUE not tested at this time     Strength/Myotome Testing     Right Shoulder     Planes of Motion   Flexion: 5   Extension: 5   Abduction: 5   External rotation at 0°: 5   Internal rotation at 0°: 5     Isolated Muscles   Biceps: 5   Triceps: 5     Additional Strength Details  LUE not tested at this time          Assessment & Plan     Assessment  Impairments: abnormal muscle firing, abnormal  muscle tone, abnormal or restricted ROM, activity intolerance, impaired physical strength, lacks appropriate home exercise program and pain with function  Functional Limitations: carrying objects, lifting, sleeping, pulling, pushing, uncomfortable because of pain, moving in bed, reaching behind back, reaching overhead and unable to perform repetitive tasks  Assessment details: Patient is a 56 y.o. year old female who presents to therapy with L RCT repair on July 11th.  she presents with significant impairments including decreased LUE ROM, decreased LUE strength, decreased lifting and activity tolerance, impaired Quick DASH score, and pain. Impairments affect ADL/IADL's, functional activities, recreational activities, and community activities. Pt will benefit from skilled physical therapy to address impairments, decrease pain and restore function.  Prognosis: good    Goals  Plan Goals: Pt would benefit from skilled care for the listed deficits of the L shoulder.    SHORT TERM GOALS: Time for Goal Achievement: 4 weeks  1.  Patient to be compliant with and understand progression of HEP.   2.  Increase cervical, thoracic and (L) GH mobility to allow for improved mobility of shoulder with less pain  3.  Increased (L) UE strength to 4+/5 to allow for household activities, including lifting.  4.  Pt to exhibit (L) shoulder active flexion/° AROM to assist with reaching overhead without pain.    LONG TERM GOALS: Time for Goal Achievement: 2 months  1.  Pt score < 25% perceived disability on Quick DASH.  2.  Pt. to exhibit (L) shoulder AROM to WFL to allow for reaching overhead and out (ABD) without pain limiting function.  3.  Pt to exhibit 5/5 UE strength to allow for pushing /pulling and lifting to occur with pain < 3/10  4.  Pt able to reach overhead and lift 10# to allow for return to doing home/yard/recreational activities with min to no pain.    Plan  Therapy options: will be seen for skilled therapy  services  Planned modality interventions: cryotherapy, electrical stimulation/Russian stimulation, high voltage pulsed current (pain management), TENS and thermotherapy (hydrocollator packs)  Planned therapy interventions: manual therapy, motor coordination training, neuromuscular re-education, postural training, soft tissue mobilization, spinal/joint mobilization, strengthening, stretching, therapeutic activities, joint mobilization, IADL retraining, home exercise program, functional ROM exercises, flexibility, fine motor coordination training, abdominal trunk stabilization and ADL retraining  Duration in visits: 20  Treatment plan discussed with: patient        History # of Personal Factors and/or Comorbidities: MODERATE (1-2)  Examination of Body System(s): # of elements: LOW (1-2)  Clinical Presentation: STABLE   Clinical Decision Making: LOW     Timed:         Manual Therapy:    10     mins  12276;     Therapeutic Exercise:    15     mins  33913;     Neuromuscular Indio:        mins  56473;    Therapeutic Activity:          mins  69247;     Gait Training:           mins  29078;     Ultrasound:          mins  25580;    Ionto                                   mins   34877  Self Care                            mins   20932        Un-Timed:  Electrical Stimulation:         mins  37664 ( );  Dry Needling          mins self-pay  Traction          mins 52656  Low Eval     20     Mins  43995  Mod Eval          Mins  40924  High Eval                            Mins  28750  Re-Eval                               mins  80242        Timed Treatment:   25   mins   Total Treatment:     45   mins  PT SIGNATURE: Fiona Pan PT, DPT    DATE TREATMENT INITIATED: 8/30/2022    Initial Certification  Certification Period: 11/28/2022  I certify that the therapy services are furnished while this patient is under my care.  The services outlined above are required by this patient, and will be reviewed every 90  days.     PHYSICIAN: Jeferson Plaza MD      DATE:     Please sign and return via fax to [phone].. Thank you, [facility].

## 2022-09-02 ENCOUNTER — TREATMENT (OUTPATIENT)
Dept: PHYSICAL THERAPY | Facility: CLINIC | Age: 57
End: 2022-09-02

## 2022-09-02 DIAGNOSIS — Z98.890 S/P LEFT ROTATOR CUFF REPAIR: Primary | ICD-10-CM

## 2022-09-02 DIAGNOSIS — M54.2 PAIN, NECK: ICD-10-CM

## 2022-09-02 PROCEDURE — 97110 THERAPEUTIC EXERCISES: CPT | Performed by: PHYSICAL THERAPIST

## 2022-09-02 PROCEDURE — 97140 MANUAL THERAPY 1/> REGIONS: CPT | Performed by: PHYSICAL THERAPIST

## 2022-09-02 NOTE — PROGRESS NOTES
Physical Therapy Daily Progress Note    VISIT#: 2    Subjective   Tonja Ledbetter reports that she had some tenderness following her initial evaluation and then while performing pulleys she felt a sharp pain in her arm and stopped.   Pain Rating (0/10): 6    Objective     See Exercise, Manual, and Modality Logs for complete treatment.     Patient Education: progression of therapy, POC, protocol    Assessment/Plan   Pt's arm assessed with no signs of injury to the sx noted with low level activities performed, along with  STM with tenderness anterior shoulder. Ice pack applied to the shoulder for pain reduction    Plan  Progress per Plan of Care and Progress strengthening /stabilization /functional activity            Timed:         Manual Therapy:    15     mins  73474;     Therapeutic Exercise:    15     mins  68245;     Neuromuscular Indio:        mins  01411;    Therapeutic Activity:          mins  97702;     Gait Training:           mins  03674;     Ultrasound:          mins  94118;    Ionto                                   mins   61017  Self Care                            mins   16701    Un-Timed:  Electrical Stimulation:         mins  07031 ( );  Dry Needling          mins self-pay  Traction          mins 35633  Low Eval          Mins  24152  Mod Eval          Mins  74423  High Eval                            Mins  94823  Re-Eval                               mins  03926    Timed Treatment:   30   mins   Total Treatment:     30   mins    Fiona Pan PT, DPT  Physical Therapist

## 2022-09-12 ENCOUNTER — TREATMENT (OUTPATIENT)
Dept: PHYSICAL THERAPY | Facility: CLINIC | Age: 57
End: 2022-09-12

## 2022-09-12 DIAGNOSIS — M54.2 PAIN, NECK: ICD-10-CM

## 2022-09-12 DIAGNOSIS — Z98.890 S/P LEFT ROTATOR CUFF REPAIR: Primary | ICD-10-CM

## 2022-09-12 PROCEDURE — 97140 MANUAL THERAPY 1/> REGIONS: CPT | Performed by: PHYSICAL THERAPIST

## 2022-09-12 NOTE — PROGRESS NOTES
Physical Therapy Daily Progress Note    VISIT#: 3    Subjective   Tonja Ledbetter reports that she performed her HEP at the end of the week and noted an increase in pain and followed up with her sx and was instructed to stop pulleys and to continue with ROM exercises  Pain Rating (0/10): 6    Objective     See Exercise, Manual, and Modality Logs for complete treatment.     Patient Education: progression of therapy, POC and protocol    Assessment/Plan   Manual therapy was performed with STM and PROM performed with slight irritation noted with CP placed on her L shoulder for pain management    Plan  Progress per Plan of Care and Progress strengthening /stabilization /functional activity            Timed:         Manual Therapy:    30     mins  93746;     Therapeutic Exercise:         mins  13282;     Neuromuscular Indio:        mins  10165;    Therapeutic Activity:          mins  49902;     Gait Training:           mins  55140;     Ultrasound:          mins  06580;    Ionto                                   mins   66227  Self Care                            mins   35912    Un-Timed:  Electrical Stimulation:         mins  86172 ( );  Dry Needling          mins self-pay  Traction          mins 67414  Low Eval          Mins  55302  Mod Eval          Mins  51098  High Eval                            Mins  82529  Re-Eval                               mins  95260    Timed Treatment:   30   mins   Total Treatment:     30   mins    Fiona Pan PT, DPT  Physical Therapist

## 2022-10-26 ENCOUNTER — DOCUMENTATION (OUTPATIENT)
Dept: PHYSICAL THERAPY | Facility: CLINIC | Age: 57
End: 2022-10-26

## 2022-10-26 NOTE — PROGRESS NOTES
Discharge Summary  Discharge Summary from Physical Therapy Report      Date of Initial PT visit: 8/30/22  Number of Visits Seen: 3     Discharge Status of Patient:Pt was seen for 2 sessions following initial evaluation and cancelled all remaining appointments.     Goals: Not Met    Discharge Plan: Future need for rehabilitation activities      Date of Discharge 10/26/22        Fiona Pan, PT, DPT  Physical Therapist

## 2025-05-07 ENCOUNTER — PATIENT ROUNDING (BHMG ONLY) (OUTPATIENT)
Dept: URGENT CARE | Facility: CLINIC | Age: 60
End: 2025-05-07
Payer: COMMERCIAL

## 2025-05-07 NOTE — ED NOTES
Thank you for letting us care for you in your recent visit to our urgent care center. We would love to hear about your experience with us. Was this the first time you have visited our location?    We’re always looking for ways to make our patients’ experiences even better. Do you have any recommendations on ways we may improve?     I appreciate you taking the time to respond. Please be on the lookout for a survey about your recent visit from [facility] via text or email. We would greatly appreciate if you could fill that out and turn it back in. We want your voice to be heard and we value your feedback.   Thank you for choosing [facility] for your healthcare needs.

## 2025-06-08 NOTE — PROGRESS NOTES
Nystatin to breast folds and groin  Wound care following     Physical Therapy Daily Progress Note    VISIT#: 16/20 in POC.    Date of surgery: 8/27/2020 (Massive  (R) RCR - supraspinatus, infraspinatus, subscapularis by Dr. Plaza) .  no passive ER beyond 60 degs, flex/scaption as db., no strengthening until 12 weeks).  As of 12/17/20-16 weeks post  Precautions: Follow protocol, h/o breast CA within last year (no e-stim)  Dr. Plaza 1/21/21    Subjective   Tonja Ledbetter reports she is doing well.  She feels that she has turned a corner since last visit.      Pain Rating (0/10): 1    Objective     See Exercise, Manual, and Modality Logs for complete treatment.     Assessment/Plan  Patient with improved tolerance to activity today. Able to be progressed slightly more today without pain increase.  Pt had some reports of weakness from working muscles end of session.     Plan  Progress per Plan of Care/per protocol.           Timed:         Manual Therapy:   15     mins  14915;     Therapeutic Exercise:    35     mins  67125;     Neuromuscular Indio:        mins  88853;    Therapeutic Activity:          mins  68929;     Gait Training:           mins  29337;     Ultrasound:          mins  30097;    Ionto                                   mins   09743  Self Care                            mins   78277    Un-Timed:  Electrical Stimulation:         mins  56212 ( );  Dry Needling          mins self-pay  Traction          mins 88797  Low Eval          Mins  75296  Mod Eval          Mins  66656  High Eval                            Mins  86721  Re-Eval                               mins  31375    Timed Treatment:  50    mins   Total Treatment:    50    mins    Katlin Turner, PT, DPT  Physical Therapist

## (undated) DEVICE — PK ENDO GI 50

## (undated) DEVICE — BITEBLOCK ENDO W/STRAP 60F A/ LF DISP

## (undated) DEVICE — PAPR PRNT PK SONY W RIBN UPC55